# Patient Record
Sex: FEMALE | Race: WHITE | Employment: OTHER | URBAN - METROPOLITAN AREA
[De-identification: names, ages, dates, MRNs, and addresses within clinical notes are randomized per-mention and may not be internally consistent; named-entity substitution may affect disease eponyms.]

---

## 2017-01-30 ENCOUNTER — OFFICE VISIT (OUTPATIENT)
Dept: INTERNAL MEDICINE CLINIC | Facility: CLINIC | Age: 62
End: 2017-01-30

## 2017-01-30 VITALS
DIASTOLIC BLOOD PRESSURE: 70 MMHG | HEART RATE: 76 BPM | BODY MASS INDEX: 22.82 KG/M2 | SYSTOLIC BLOOD PRESSURE: 110 MMHG | HEIGHT: 65 IN | WEIGHT: 137 LBS | TEMPERATURE: 98 F

## 2017-01-30 DIAGNOSIS — R82.90 BAD ODOR OF URINE: Primary | ICD-10-CM

## 2017-01-30 LAB
APPEARANCE: CLEAR
MULTISTIX LOT#: NORMAL NUMERIC
PH, URINE: 6.5 (ref 4.5–8)
SPECIFIC GRAVITY: 1 (ref 1–1.03)
UROBILINOGEN,SEMI-QN: 0.2 MG/DL (ref 0–1.9)

## 2017-01-30 PROCEDURE — 99213 OFFICE O/P EST LOW 20 MIN: CPT | Performed by: NURSE PRACTITIONER

## 2017-01-30 PROCEDURE — 81003 URINALYSIS AUTO W/O SCOPE: CPT | Performed by: NURSE PRACTITIONER

## 2017-01-30 NOTE — PROGRESS NOTES
William Miguel is a 65year old female. Patient presents with:  Urinary Symptoms (urologic): dark yellow urine and foul odor on and off for about 1 month.  No pain, no urgency or frequency      HPI:   Here for eval of dark urine with foul odor for abou Maternal Grandmother    • Heart Disease Mother      hardening of the arteries   • MS [Other] [OTHER] Sister          • lupus [Other] [OTHER] Maternal Aunt          • Cancer Mother      breast/bone        Allergies  No Known Allergies    REVIEW OF SYSTEMS:

## 2017-04-07 ENCOUNTER — CHARTING TRANS (OUTPATIENT)
Dept: OTHER | Age: 62
End: 2017-04-07

## 2017-05-30 ENCOUNTER — CHARTING TRANS (OUTPATIENT)
Dept: OTHER | Age: 62
End: 2017-05-30

## 2017-06-27 ENCOUNTER — CHARTING TRANS (OUTPATIENT)
Dept: OTHER | Age: 62
End: 2017-06-27

## 2017-07-09 ENCOUNTER — HOSPITAL ENCOUNTER (EMERGENCY)
Age: 62
Discharge: HOME | End: 2017-07-09
Attending: EMERGENCY MEDICINE
Payer: COMMERCIAL

## 2017-07-09 VITALS
BODY MASS INDEX: 26 KG/M2 | WEIGHT: 150 LBS | DIASTOLIC BLOOD PRESSURE: 84 MMHG | SYSTOLIC BLOOD PRESSURE: 111 MMHG | HEART RATE: 84 BPM | RESPIRATION RATE: 18 BRPM | OXYGEN SATURATION: 98 % | TEMPERATURE: 99 F

## 2017-07-09 DIAGNOSIS — S60.454A FOREIGN BODY OF RIGHT RING FINGER: Primary | ICD-10-CM

## 2017-07-09 PROCEDURE — 99282 EMERGENCY DEPT VISIT SF MDM: CPT

## 2017-07-09 PROCEDURE — 99281 EMR DPT VST MAYX REQ PHY/QHP: CPT

## 2017-07-09 NOTE — ED PROVIDER NOTES
Patient Seen in: THE [facility] Emergency Department In [place]    History   Patient presents with:  Swelling Edema (cardiovascular, metabolic)    Stated Complaint: right hand swelling     HPI    The patient is a 67-year-old female presenting to the emergency and vital signs reviewed. All other systems reviewed and negative except as noted above. PSFH elements reviewed from today and agreed except as otherwise stated in HPI.     Physical Exam   ED Triage Vitals [07/09/17 1728]  BP: 111/84  Pulse: 84  Res

## 2017-07-25 ENCOUNTER — CHARTING TRANS (OUTPATIENT)
Dept: OTHER | Age: 62
End: 2017-07-25

## 2017-08-24 ENCOUNTER — CHARTING TRANS (OUTPATIENT)
Dept: OTHER | Age: 62
End: 2017-08-24

## 2017-10-05 ENCOUNTER — MED REC SCAN ONLY (OUTPATIENT)
Dept: INTERNAL MEDICINE CLINIC | Facility: CLINIC | Age: 62
End: 2017-10-05

## 2017-10-12 ENCOUNTER — CHARTING TRANS (OUTPATIENT)
Dept: OTHER | Age: 62
End: 2017-10-12

## 2017-10-12 ENCOUNTER — LAB SERVICES (OUTPATIENT)
Dept: OTHER | Age: 62
End: 2017-10-12

## 2017-10-16 LAB — PATH REPORT: NORMAL

## 2017-11-20 ENCOUNTER — CHARTING TRANS (OUTPATIENT)
Dept: OTHER | Age: 62
End: 2017-11-20

## 2017-12-02 ENCOUNTER — TELEPHONE (OUTPATIENT)
Dept: INTERNAL MEDICINE CLINIC | Facility: CLINIC | Age: 62
End: 2017-12-02

## 2017-12-02 ENCOUNTER — HOSPITAL ENCOUNTER (OUTPATIENT)
Facility: HOSPITAL | Age: 62
Setting detail: OBSERVATION
Discharge: HOME | End: 2017-12-03
Attending: EMERGENCY MEDICINE | Admitting: HOSPITALIST
Payer: COMMERCIAL

## 2017-12-02 ENCOUNTER — APPOINTMENT (OUTPATIENT)
Dept: GENERAL RADIOLOGY | Age: 62
End: 2017-12-02
Attending: EMERGENCY MEDICINE
Payer: COMMERCIAL

## 2017-12-02 ENCOUNTER — APPOINTMENT (OUTPATIENT)
Dept: CT IMAGING | Age: 62
End: 2017-12-02
Attending: EMERGENCY MEDICINE
Payer: COMMERCIAL

## 2017-12-02 ENCOUNTER — APPOINTMENT (OUTPATIENT)
Dept: CV DIAGNOSTICS | Facility: HOSPITAL | Age: 62
End: 2017-12-02
Attending: HOSPITALIST
Payer: COMMERCIAL

## 2017-12-02 DIAGNOSIS — R55 SYNCOPE AND COLLAPSE: Primary | ICD-10-CM

## 2017-12-02 DIAGNOSIS — S01.01XA SCALP LACERATION, INITIAL ENCOUNTER: ICD-10-CM

## 2017-12-02 DIAGNOSIS — C88.0 WALDENSTROM MACROGLOBULINEMIA (HCC): ICD-10-CM

## 2017-12-02 PROCEDURE — 99220 INITIAL OBSERVATION CARE,LEVL III: CPT | Performed by: HOSPITALIST

## 2017-12-02 PROCEDURE — 93306 TTE W/DOPPLER COMPLETE: CPT | Performed by: HOSPITALIST

## 2017-12-02 PROCEDURE — 70450 CT HEAD/BRAIN W/O DYE: CPT | Performed by: EMERGENCY MEDICINE

## 2017-12-02 PROCEDURE — 71010 XR CHEST AP PORTABLE  (CPT=71010): CPT | Performed by: EMERGENCY MEDICINE

## 2017-12-02 RX ORDER — LEVOTHYROXINE SODIUM 0.05 MG/1
50 TABLET ORAL
Status: DISCONTINUED | OUTPATIENT
Start: 2017-12-02 | End: 2017-12-03

## 2017-12-02 RX ORDER — TRAZODONE HYDROCHLORIDE 50 MG/1
50 TABLET ORAL NIGHTLY PRN
Status: DISCONTINUED | OUTPATIENT
Start: 2017-12-02 | End: 2017-12-03

## 2017-12-02 RX ORDER — ACETAMINOPHEN 325 MG/1
650 TABLET ORAL EVERY 6 HOURS PRN
Status: DISCONTINUED | OUTPATIENT
Start: 2017-12-02 | End: 2017-12-03

## 2017-12-02 RX ORDER — ASPIRIN 325 MG
325 TABLET, DELAYED RELEASE (ENTERIC COATED) ORAL ONCE
Status: COMPLETED | OUTPATIENT
Start: 2017-12-02 | End: 2017-12-02

## 2017-12-02 RX ORDER — SODIUM CHLORIDE 9 MG/ML
INJECTION, SOLUTION INTRAVENOUS CONTINUOUS
Status: ACTIVE | OUTPATIENT
Start: 2017-12-02 | End: 2017-12-02

## 2017-12-02 RX ORDER — ONDANSETRON 2 MG/ML
8 INJECTION INTRAMUSCULAR; INTRAVENOUS EVERY 6 HOURS PRN
Status: DISCONTINUED | OUTPATIENT
Start: 2017-12-02 | End: 2017-12-03

## 2017-12-02 NOTE — TELEPHONE ENCOUNTER
Pt called stated Thurs morning she fell she must have passed out because her  found her laying on the ground. She hit the back of her head and was bleeding (not bleeding now). Pt stated she woke up today with a headache and blurry vision. Per JV provider on call stated to go to the ER. Pt verbalized understanding.

## 2017-12-02 NOTE — ED INITIAL ASSESSMENT (HPI)
Here for headache, blurred vision /  States she had a Syncopal episode Thursday morning, passed out on her bathroom floor. +head injury.

## 2017-12-02 NOTE — ED PROVIDER NOTES
Patient Seen in: [facility] Emergency Department In [place]    History   Patient presents with:  Syncope (cardiovascular, neurologic)    Stated Complaint: syncopal and visual disturbance     HPI    67-year-old female presents emergency room for evaluation ([facility] 02/03/16    waldenstonstroms macro globulen anemia   • Thyroid disease        Past Surgical History:  2005: COLPOSCOPY, CERVIX W/UPPER ADJACENT VAGINA; W/*  01/2010 : OTHER SURGICAL HISTORY      Comment: Nasal surgery  No date: OTHER SURGICAL HISTORY tenderness  EXTREMITIES: No tenderness or deformity bilateral upper or lower extremities. No peripheral edema, no calf tenderness, dorsal pedal pulses present and equal bilaterally. SKIN: Warm, dry, intact, no rashes.   NEUROLOGIC EXAM: Tongue midline, no T-wave abnormality           ED Course as of Dec 02 1314  ------------------------------------------------------------       [facility]   Patient IV established, placed on cardiac monitor and pulse ox. Patient has been stable throughout ER stay.   Discussed all f

## 2017-12-02 NOTE — H&P
[facility]ITAL[facility]  History and Physical     Natalee Sy Patient Status:  Observation    1955 MRN [id number]   [facility] 8NE-A Attending Marlon Garcia MD   Hosp Day # 0 PCP Aníbal Garcia MD     Chief Complaint: passed out Prescriptions on File Prior to Encounter:  Levothyroxine Sodium (SYNTHROID) 50 MCG Oral Tab TAKE 1 TABLET BY MOUTH EVERY DAY Disp: 90 tablet Rfl: 3   TURMERIC OR Take 1 tablet by mouth daily. Disp:  Rfl:    Omega-3 Fatty Acids (FISH OIL OR) Take by mouth. (based on SCr of 0.63 mg/dL). Recent Labs   Lab  12/02/17   1017   PTP  12.4   INR  0.95       Recent Labs   Lab  12/02/17   1017   TROP  <0.046       Imaging: Imaging data reviewed in Epic. ASSESSMENT / PLAN:     1.  Syncope-sounds like micturition

## 2017-12-02 NOTE — PLAN OF CARE
Received patient from  ED around 1430. Patient admitted for syncopal episode that occurred Thursday at home, where she hit her head and now has persistent headache with some blurred vision. Patient a&ox4. Vital signs stable- Afebrile. NSR on tele.  [address]

## 2017-12-02 NOTE — PROGRESS NOTES
12/02/17 1448 12/02/17 1450 12/02/17 1452   Vital Signs   Pulse 69 68 74   Heart Rate Source Monitor --  --    Cardiac Rhythm NSR --  --    Resp 16 16 --    Respiratory Quality Normal --  --    /70 111/75 113/74   BP Location Left arm Left arm Lef

## 2017-12-02 NOTE — ED NOTES
Report to Humberto Clifford RN. THE [facility] ambulance was contacted to transport this patient.

## 2017-12-03 ENCOUNTER — APPOINTMENT (OUTPATIENT)
Dept: MRI IMAGING | Facility: HOSPITAL | Age: 62
End: 2017-12-03
Attending: Other
Payer: COMMERCIAL

## 2017-12-03 VITALS
TEMPERATURE: 98 F | WEIGHT: 129.44 LBS | HEIGHT: 64 IN | OXYGEN SATURATION: 100 % | DIASTOLIC BLOOD PRESSURE: 69 MMHG | RESPIRATION RATE: 18 BRPM | HEART RATE: 85 BPM | BODY MASS INDEX: 22.1 KG/M2 | SYSTOLIC BLOOD PRESSURE: 113 MMHG

## 2017-12-03 PROCEDURE — 70551 MRI BRAIN STEM W/O DYE: CPT | Performed by: OTHER

## 2017-12-03 PROCEDURE — 95816 EEG AWAKE AND DROWSY: CPT | Performed by: OTHER

## 2017-12-03 PROCEDURE — 99245 OFF/OP CONSLTJ NEW/EST HI 55: CPT | Performed by: OTHER

## 2017-12-03 PROCEDURE — 99225 SUBSEQUENT OBSERVATION CARE: CPT | Performed by: HOSPITALIST

## 2017-12-03 NOTE — PLAN OF CARE
Tele D/C'd. IV discontinued with catheter intact. Patient denies chest pain, SOB, dizziness or palpitations. Patient denies calf tenderness. Patient discharge instructions and medication side effects reviewed with patient and verbalized understanding.   Fany Cagle

## 2017-12-03 NOTE — PROGRESS NOTES
[facility]  Progress note     Celsoaram Fam Patient Status:  Observation    1955 MRN [id number]   [facility] 8NE-A Attending Rosario Tavares MD   Hosp Day # 0 PCP Reuben Phelps MD     Chief Complaint: passed out    [facility] pending    Quality:  · DVT Prophylaxis: scds  · CODE status: full  · Sanchez: no    Plan of care discussed with patient and rn    Flex Camara MD

## 2017-12-03 NOTE — DISCHARGE SUMMARY
[facility] HOSPITALIST  DISCHARGE SUMMARY     Ed Bookbinder Patient Status:  Observation    1955 MRN [id number]   [facility] 8NE-A Attending Julio César Posey MD   Hosp Day # 0 PCP Marie Calabrese MD     Date of Admission: 2017  Balwinder hospitalization:   • See above    Incidental or significant findings and recommendations (brief descriptions):  • none    Lab/Test results pending at Discharge:   · none    Consultants:  • Neuro,cards    Discharge Medication List:     Discharge Medications

## 2017-12-03 NOTE — PLAN OF CARE
CARDIOVASCULAR - ADULT    • Maintains optimal cardiac output and hemodynamic stability Progressing    • Absence of cardiac arrhythmias or at baseline Progressing        SAFETY ADULT - FALL    • Free from fall injury Progressing          Assumed care for pt

## 2017-12-03 NOTE — PROGRESS NOTES
66 y/o female with hx NHL with Waldenstroms macroglobulinemia had syncopal event Thursday. Blurry vision yesterday. Most likely micturition syncope. Needs echo EEG but seizure unlikely. CTA corns in morning and if normal then home.

## 2017-12-03 NOTE — PROGRESS NOTES
S: no symptoms; feeling well  O: no events on tele; exam benign; vitals stable  A/p: suspect micturition syncope and resulting concussion; however, had no prodrome to event; consider arrhymogenic cause; possible need for event monitor? ?  Await cards recs;

## 2017-12-03 NOTE — CONSULTS
[facility]    Report of Consultation    Torie Juan Patient Status:  Observation    1955 MRN [id number]   [facility] 8NE-A Attending Lexie Wooten MD   Hosp Day # 0 PCP Dania Healy MD     Date of Admission:   reports that she has never smoked. She has never used smokeless tobacco. She reports that she does not drink alcohol or use drugs.     Allergies:  No Known Allergies    Medications:    Current Facility-Administered Medications:   •  Levothyroxine Sodium (SY ([address]     Syncope and collapse     Scalp laceration, initial encounter     Concussion with loss of consciousness        Impression:  Episode of loss of consciousness  Post concussive syndrome  Post concussive headache    Discussion/recommendations:  Sudden

## 2017-12-04 NOTE — CONSULTS
[facility]    PATIENT'S NAME: Romulo Nicolas   ATTENDING PHYSICIAN: Frank Lyman M.D.   [facility]: Hiro Freitas M.D.    PATIENT ACCOUNT#:   [de-identified]    LOCATION:  [address] [place] [address]  MEDICAL RECORD #:   [id number]       DATE OF BIRTH you.    Dictated By Jacinto Chong M.D.  d: 12/03/2017 13:42:56  t: 12/03/2017 14:26:18  [facility] [id number]  [facility]/

## 2017-12-04 NOTE — PROCEDURES
[facility], [address] [place] [address]      PATIENT'S NAME:  Nnamdi   ATTENDING PHYSICIAN: Day Quiroz M.D.    PATIENT ACCOUNT #: [de-identified] LOCATION: [address] [place] [address]   MEDICAL RECORD #: [id number] DATE OF BIR

## 2017-12-04 NOTE — PAYOR COMM NOTE
--------------  ADMISSION REVIEW     Payor: Amber Perfecto #:  [id number]  Authorization Number: [id number]    Admit date: N/A  Admit time: N/A       Admitting Physician: Gordo He MD  Attending Physician:  No att. providers found  Primary Care P seconds. The therapeutic range has been validated against 0.3-0.7 heparin anti-Xa units/mL. CBC WITH DIFFERENTIAL WITH PLATELET    Narrative: The following orders were created for panel order CBC WITH DIFFERENTIAL WITH PLATELET.   Procedure getting out of bed. The patient is to have an echocardiogram.  I am a little bothered by the blurry vision the following day, which seems a little bit unusual.  Patient is to get an EEG, but this does not seem to be a seizure disorder.   I probably will g

## 2017-12-05 ENCOUNTER — PATIENT OUTREACH (OUTPATIENT)
Dept: CASE MANAGEMENT | Age: 62
End: 2017-12-05

## 2017-12-05 ENCOUNTER — PATIENT MESSAGE (OUTPATIENT)
Dept: CASE MANAGEMENT | Age: 62
End: 2017-12-05

## 2017-12-05 DIAGNOSIS — Z02.9 ENCOUNTERS FOR ADMINISTRATIVE PURPOSE: ICD-10-CM

## 2017-12-05 NOTE — PROGRESS NOTES
LM for pt to call NCM back for condition update. [employer] message sent as well for condition update.

## 2017-12-08 ENCOUNTER — HOSPITAL ENCOUNTER (OUTPATIENT)
Dept: CT IMAGING | Facility: HOSPITAL | Age: 62
Discharge: HOME | End: 2017-12-08
Attending: INTERNAL MEDICINE
Payer: COMMERCIAL

## 2017-12-08 DIAGNOSIS — R55 SYNCOPE AND COLLAPSE: ICD-10-CM

## 2017-12-08 DIAGNOSIS — C88.0 WALDENSTROM MACROGLOBULINEMIA (HCC): ICD-10-CM

## 2017-12-08 PROCEDURE — 75574 CT ANGIO HRT W/3D IMAGE: CPT | Performed by: INTERNAL MEDICINE

## 2017-12-08 RX ORDER — NITROGLYCERIN 0.4 MG/1
TABLET SUBLINGUAL
Status: COMPLETED
Start: 2017-12-08 | End: 2017-12-08

## 2017-12-08 RX ADMIN — NITROGLYCERIN 0.4 MG: 0.4 TABLET SUBLINGUAL at 10:45:00

## 2017-12-15 ENCOUNTER — OFFICE VISIT (OUTPATIENT)
Dept: INTERNAL MEDICINE CLINIC | Facility: CLINIC | Age: 62
End: 2017-12-15

## 2017-12-15 VITALS
WEIGHT: 130 LBS | BODY MASS INDEX: 22.2 KG/M2 | TEMPERATURE: 99 F | DIASTOLIC BLOOD PRESSURE: 74 MMHG | SYSTOLIC BLOOD PRESSURE: 130 MMHG | HEART RATE: 80 BPM | HEIGHT: 64 IN

## 2017-12-15 DIAGNOSIS — R05.9 COUGH: ICD-10-CM

## 2017-12-15 DIAGNOSIS — R55 SYNCOPE, UNSPECIFIED SYNCOPE TYPE: ICD-10-CM

## 2017-12-15 DIAGNOSIS — J06.9 ACUTE URI: Primary | ICD-10-CM

## 2017-12-15 PROCEDURE — 99214 OFFICE O/P EST MOD 30 MIN: CPT | Performed by: NURSE PRACTITIONER

## 2017-12-15 RX ORDER — AMOXICILLIN AND CLAVULANATE POTASSIUM 875; 125 MG/1; MG/1
1 TABLET, FILM COATED ORAL 2 TIMES DAILY
Qty: 20 TABLET | Refills: 0 | Status: SHIPPED | OUTPATIENT
Start: 2017-12-15 | End: 2017-12-25

## 2017-12-15 RX ORDER — CODEINE PHOSPHATE AND GUAIFENESIN 10; 100 MG/5ML; MG/5ML
10 SOLUTION ORAL NIGHTLY PRN
Qty: 240 ML | Refills: 0 | Status: SHIPPED | OUTPATIENT
Start: 2017-12-15 | End: 2018-01-25 | Stop reason: ALTCHOICE

## 2017-12-15 RX ORDER — AZELAIC ACID 0.15 G/G
1 GEL TOPICAL DAILY
COMMUNITY

## 2017-12-15 NOTE — PROGRESS NOTES
Leighton Calvin is a 65year old female. Patient presents with:  Test Results: Room 10. Review CTA  Cough: congestion, facial pain, eye swelling and ear pain/pressure for 10 days       HPI:   Here for test results and not feeling well.    She was hospit Oral Tab Take  by mouth. Disp:  Rfl:    Vitamin B-12 (VITAMIN B12) 1000 MCG Oral Tab Take 1,000 mcg by mouth daily.  Disp:  Rfl:       Past Medical History:   Diagnosis Date   • Abnormal Pap smear 2000   • Basal cell cancer    • Disorder of thyroid    • HYP alert and oriented x 3    ASSESSMENT AND PLAN:     Acute uri  (primary encounter diagnosis)  augmentin bid   Add flonase and cheratussin ac hs only. If eye irritation continues, she will call for drops.     Cough  Syncope, unspecified syncope type  W/u re

## 2017-12-18 ENCOUNTER — CHARTING TRANS (OUTPATIENT)
Dept: OTHER | Age: 62
End: 2017-12-18

## 2017-12-18 NOTE — PROGRESS NOTES
Multiple attempts to reach the pt and messages left with no returned phone call. Pt went to [place] on 12/15/17. Closing encounter.

## 2017-12-19 ENCOUNTER — TELEPHONE (OUTPATIENT)
Dept: INTERNAL MEDICINE CLINIC | Facility: CLINIC | Age: 62
End: 2017-12-19

## 2017-12-19 RX ORDER — AZITHROMYCIN 250 MG/1
TABLET, FILM COATED ORAL
Qty: 6 TABLET | Refills: 0 | Status: SHIPPED | OUTPATIENT
Start: 2017-12-19 | End: 2018-01-22

## 2017-12-19 NOTE — TELEPHONE ENCOUNTER
Patient notified to stop Augmentin, BRAT diet, monitor loose stools and call if s/s do not resolve. SD would like pt to start Z pack sent to [facility] on file for sinus infection. Pt verbalizes understanding.

## 2017-12-19 NOTE — TELEPHONE ENCOUNTER
Patient states she saw SD on Friday 12/15/17, started Augmentin(2 doses) then Saturday night pt was up all night with loose stool which has not decreased, at least 10x's a day, tried watching what she was eating, felt a little better yesterday so she ate t

## 2017-12-19 NOTE — TELEPHONE ENCOUNTER
Pt is taking Augmentin and she has an upset stomach/diarrhea since starting the medication. Is it possible its due to the medication?   Please advise

## 2018-01-22 PROBLEM — R19.4 CHANGE IN BOWEL HABITS: Status: ACTIVE | Noted: 2018-01-22

## 2018-01-22 PROBLEM — R15.9 INCONTINENCE OF FECES: Status: ACTIVE | Noted: 2018-01-22

## 2018-01-25 ENCOUNTER — OFFICE VISIT (OUTPATIENT)
Dept: NEUROLOGY | Facility: CLINIC | Age: 63
End: 2018-01-25

## 2018-01-25 VITALS
SYSTOLIC BLOOD PRESSURE: 102 MMHG | WEIGHT: 130 LBS | HEART RATE: 56 BPM | RESPIRATION RATE: 16 BRPM | DIASTOLIC BLOOD PRESSURE: 66 MMHG | BODY MASS INDEX: 22 KG/M2

## 2018-01-25 DIAGNOSIS — M54.2 NECK PAIN: ICD-10-CM

## 2018-01-25 DIAGNOSIS — G43.109 OCULAR MIGRAINE: ICD-10-CM

## 2018-01-25 DIAGNOSIS — R55 SYNCOPE, UNSPECIFIED SYNCOPE TYPE: Primary | ICD-10-CM

## 2018-01-25 PROCEDURE — 99215 OFFICE O/P EST HI 40 MIN: CPT | Performed by: OTHER

## 2018-01-25 NOTE — PROGRESS NOTES
[facility] in [place]  Neurology - Clinic Follow up  2018, 12:18 PM     Torie Juan Patient Status:  No patient class for patient encounter    1955 MRN [id number]   Location [unfilled] PCP Dania Healy MD     Pa pain/belching    • Frequent use of laxatives Imodium   • Headache    • Hemorrhoids    • Hoarseness, chronic    • HYPOTHYROIDISM    • Irregular bowel habits    • Leaking of urine    • Night sweats    • Non Hodgkin's lymphoma ([facility] 02/03/16    ml See history; relevant items discussed in the history.   Psychiatric: no depression, no suicidal attempt,   Musculoskeletal:  NO current complaint,  Endo: no cold intolerance, appetite is normal, no weight change;  Skin: warm, no rash, no allergy , no skin b unclear- micturition syncope vs. Migraine equivalent? EEG and MRI were negative (MRI only with mild incidental not consequential findings, nonspecific white matter T2 flair hyperintensities, and venous anomaly, discussed with her in detail).  She has had ca

## 2018-01-25 NOTE — PROGRESS NOTES
Pt here for hospital follow up for syncopal episode. Pt reports no similar episodes since then, and that she is feeling well.

## 2018-01-25 NOTE — PATIENT INSTRUCTIONS
Refill policies:    • Allow 2-3 business days for refills; controlled substances may take longer.   • Contact your pharmacy at least 5 days prior to running out of medication and have them send an electronic request or submit request through the [facility] recommended that you have a procedure or additional testing performed. [facility]) will contact your insurance carrier to obtain pre-certification or prior authorization.     Unfortunately, [facility] has seen an increase in denial of paym

## 2018-02-01 ENCOUNTER — HOSPITAL ENCOUNTER (OUTPATIENT)
Dept: CT IMAGING | Age: 63
Discharge: HOME | End: 2018-02-01
Attending: Other
Payer: COMMERCIAL

## 2018-02-01 DIAGNOSIS — M54.2 NECK PAIN: ICD-10-CM

## 2018-02-01 DIAGNOSIS — R55 SYNCOPE, UNSPECIFIED SYNCOPE TYPE: ICD-10-CM

## 2018-02-01 LAB — CREAT SERPL-MCNC: 0.6 MG/DL (ref 0.55–1.02)

## 2018-02-01 PROCEDURE — 70496 CT ANGIOGRAPHY HEAD: CPT | Performed by: OTHER

## 2018-02-01 PROCEDURE — 82565 ASSAY OF CREATININE: CPT

## 2018-02-01 PROCEDURE — 70498 CT ANGIOGRAPHY NECK: CPT | Performed by: OTHER

## 2018-02-05 ENCOUNTER — CHARTING TRANS (OUTPATIENT)
Dept: OTHER | Age: 63
End: 2018-02-05

## 2018-03-03 ENCOUNTER — OFFICE VISIT (OUTPATIENT)
Dept: INTERNAL MEDICINE CLINIC | Facility: CLINIC | Age: 63
End: 2018-03-03

## 2018-03-03 VITALS
HEART RATE: 72 BPM | TEMPERATURE: 98 F | WEIGHT: 132 LBS | HEIGHT: 65 IN | DIASTOLIC BLOOD PRESSURE: 60 MMHG | BODY MASS INDEX: 21.99 KG/M2 | SYSTOLIC BLOOD PRESSURE: 98 MMHG

## 2018-03-03 DIAGNOSIS — N89.8 VAGINAL DISCHARGE: Primary | ICD-10-CM

## 2018-03-03 DIAGNOSIS — N89.8 VAGINAL IRRITATION: ICD-10-CM

## 2018-03-03 LAB
APPEARANCE: CLEAR
MULTISTIX LOT#: ABNORMAL NUMERIC
PH, URINE: 7.5 (ref 4.5–8)
SPECIFIC GRAVITY: 1.02 (ref 1–1.03)
URINE-COLOR: YELLOW
UROBILINOGEN,SEMI-QN: 0.2 MG/DL (ref 0–1.9)

## 2018-03-03 PROCEDURE — 81003 URINALYSIS AUTO W/O SCOPE: CPT | Performed by: PHYSICIAN ASSISTANT

## 2018-03-03 PROCEDURE — 87086 URINE CULTURE/COLONY COUNT: CPT | Performed by: PHYSICIAN ASSISTANT

## 2018-03-03 PROCEDURE — 87480 CANDIDA DNA DIR PROBE: CPT | Performed by: PHYSICIAN ASSISTANT

## 2018-03-03 PROCEDURE — 87660 TRICHOMONAS VAGIN DIR PROBE: CPT | Performed by: PHYSICIAN ASSISTANT

## 2018-03-03 PROCEDURE — 87510 GARDNER VAG DNA DIR PROBE: CPT | Performed by: PHYSICIAN ASSISTANT

## 2018-03-03 PROCEDURE — 99213 OFFICE O/P EST LOW 20 MIN: CPT | Performed by: PHYSICIAN ASSISTANT

## 2018-03-03 RX ORDER — METRONIDAZOLE 500 MG/1
500 TABLET ORAL 2 TIMES DAILY
Qty: 14 TABLET | Refills: 0 | Status: SHIPPED | OUTPATIENT
Start: 2018-03-03 | End: 2018-03-10

## 2018-03-03 NOTE — PROGRESS NOTES
Patient presents with:  Vaginal Discharge: LG. Room 2. Greenish/brown discharge that is liquidy and isnt sure if its urine or discharge for 2 days       HPI:  Pt presents with c/o 2 days of a thin brownish-green d/c she has noticed on her panty liner.   Alexandra Serrano metRONIDAZOLE 500 MG Oral Tab Take 1 tablet (500 mg total) by mouth 2 (two) times daily. Disp: 14 tablet Rfl: 0   Azelaic Acid (FINACEA) 15 % External Gel Apply 1 Application topically daily. Disp:  Rfl:    Omega-3 Fatty Acids (FISH OIL OR) Take by mouth. Vaginitis/Vaginosis, Dna Probe    Meds & Refills for this Visit:  Signed Prescriptions Disp Refills    metRONIDAZOLE 500 MG Oral Tab 14 tablet 0      Sig: Take 1 tablet (500 mg total) by mouth 2 (two) times daily.            Imaging & Consults:  None · Don't douche. · Don't have sex. If you do have sex, then take steps to lower your risk:  ¨ Use condoms when having sex. ¨ Limit the number of sexual partners you have. Follow-up care  Follow up with your healthcare provider, or as advised.   When to se

## 2018-03-03 NOTE — PATIENT INSTRUCTIONS
Bacterial Vaginosis    You have a vaginal infection called bacterial vaginosis (BV). Both good and bad bacteria are present in a healthy vagina. BV occurs when these bacteria get out of balance. The number of bad bacteria increase.  And the number of good · You have a fever of 100.4ºF (38ºC) or higher, or as directed by your provider. · Your symptoms worsen, or they don’t go away within a few days of starting treatment. · You have new pain in the lower belly or pelvic region.   · You have side effects that

## 2018-03-05 ENCOUNTER — CHARTING TRANS (OUTPATIENT)
Dept: OTHER | Age: 63
End: 2018-03-05

## 2018-03-06 NOTE — PROGRESS NOTES
If the discharge has stopped on flagyl and she is tolerating well then (based on exam findings) she could finish the Flagyl course. If still with same thin d/c I would have her stop the Flagyl and f/u in office.

## 2018-04-02 ENCOUNTER — CHARTING TRANS (OUTPATIENT)
Dept: OTHER | Age: 63
End: 2018-04-02

## 2018-05-07 ENCOUNTER — CHARTING TRANS (OUTPATIENT)
Dept: OTHER | Age: 63
End: 2018-05-07

## 2018-05-31 ENCOUNTER — CHARTING TRANS (OUTPATIENT)
Dept: OTHER | Age: 63
End: 2018-05-31

## 2018-06-04 ENCOUNTER — CHARTING TRANS (OUTPATIENT)
Dept: OTHER | Age: 63
End: 2018-06-04

## 2018-06-28 ENCOUNTER — OFFICE VISIT (OUTPATIENT)
Dept: NEUROLOGY | Facility: CLINIC | Age: 63
End: 2018-06-28

## 2018-06-28 VITALS
BODY MASS INDEX: 21.99 KG/M2 | DIASTOLIC BLOOD PRESSURE: 64 MMHG | WEIGHT: 132 LBS | HEIGHT: 65 IN | SYSTOLIC BLOOD PRESSURE: 90 MMHG | RESPIRATION RATE: 16 BRPM | HEART RATE: 66 BPM

## 2018-06-28 DIAGNOSIS — R55 SYNCOPE, UNSPECIFIED SYNCOPE TYPE: Primary | ICD-10-CM

## 2018-06-28 DIAGNOSIS — R41.3 SHORT-TERM MEMORY LOSS: ICD-10-CM

## 2018-06-28 DIAGNOSIS — G43.109 OCULAR MIGRAINE: ICD-10-CM

## 2018-06-28 DIAGNOSIS — D75.9 HYPERVISCOSITY: ICD-10-CM

## 2018-06-28 PROCEDURE — 99214 OFFICE O/P EST MOD 30 MIN: CPT | Performed by: OTHER

## 2018-06-28 NOTE — PROGRESS NOTES
[facility] in [place]  Neurology - Clinic Follow up       Sania Arriaga Patient Status:  No patient class for patient encounter    1955 MRN [id number]   Location [unfilled] PCP Reuben Phelps MD     No chief complaint on fi 11/7/2014   Vitamin B12      200 - 1,100 pg/mL  1,479 (H)   TSH      0.40 - 4.50 mIU/L 2.23        PAST MEDICAL HISTORY:   Past Medical History:   Diagnosis Date   • Abnormal Pap smear 2000   • Anemia    • Basal cell cancer    • Blurred vision    • Body pi SYSTEMS:  General: denies any fever or chills. Eye: no pain or redness;  Ear, mouth, throat: no hearing change, no throat pain or soreness; Respiratory: Denies: Difficulty Breathing, Chronic Cough and Wheezing.   Cardiovascular: NO Chest Pain and Palpi GFRNAA 92 03/02/2015   CA 8.9 12/02/2017    12/02/2017   K 4.1 12/02/2017    12/02/2017   CO2 28.0 12/02/2017         Imaging:  As above    Assessment/Plan:   1. Syncope, unspecified syncope type    2. Ocular migraine    3.  Hyperviscosity

## 2018-06-28 NOTE — PATIENT INSTRUCTIONS
Refill policies:    • Allow 2-3 business days for refills; controlled substances may take longer.   • Contact your pharmacy at least 5 days prior to running out of medication and have them send an electronic request or submit request through the “request re entire amount billed. Precertification and Prior Authorizations: If your physician has recommended that you have a procedure or additional testing performed.   [facility]) will contact your insurance carrier to obtain pre-certi

## 2018-07-02 ENCOUNTER — CHARTING TRANS (OUTPATIENT)
Dept: OTHER | Age: 63
End: 2018-07-02

## 2018-07-30 ENCOUNTER — CHARTING TRANS (OUTPATIENT)
Dept: OTHER | Age: 63
End: 2018-07-30

## 2018-11-29 ENCOUNTER — CHARTING TRANS (OUTPATIENT)
Dept: OTHER | Age: 63
End: 2018-11-29

## 2018-12-12 ENCOUNTER — OFFICE VISIT (OUTPATIENT)
Dept: DERMATOLOGY | Age: 63
End: 2018-12-12

## 2018-12-12 DIAGNOSIS — Z41.9 SURGERY, ELECTIVE: Primary | ICD-10-CM

## 2018-12-12 PROCEDURE — X1094 NO CHARGE VISIT: HCPCS

## 2018-12-17 ENCOUNTER — OFFICE VISIT (OUTPATIENT)
Dept: DERMATOLOGY | Age: 63
End: 2018-12-17

## 2018-12-17 DIAGNOSIS — D48.5 NEOPLASM OF UNCERTAIN BEHAVIOR OF SKIN: Primary | ICD-10-CM

## 2018-12-17 DIAGNOSIS — L82.0 SEBORRHEIC KERATOSES, INFLAMED: ICD-10-CM

## 2018-12-17 PROBLEM — L82.1 SEBORRHEIC KERATOSIS: Status: ACTIVE | Noted: 2018-12-17

## 2018-12-17 PROCEDURE — 11100 BIOPSY OF SKIN LESION: CPT | Performed by: NURSE PRACTITIONER

## 2018-12-17 PROCEDURE — 17110 DESTRUCTION B9 LES UP TO 14: CPT | Performed by: NURSE PRACTITIONER

## 2018-12-17 PROCEDURE — 99213 OFFICE O/P EST LOW 20 MIN: CPT | Performed by: NURSE PRACTITIONER

## 2018-12-17 RX ORDER — AZELAIC ACID 0.15 G/G
GEL TOPICAL
COMMUNITY
Start: 2018-01-30 | End: 2019-10-24 | Stop reason: SDUPTHER

## 2018-12-17 RX ORDER — BRIMONIDINE 5 MG/G
GEL TOPICAL
COMMUNITY
Start: 2018-11-29

## 2018-12-17 RX ORDER — OMEPRAZOLE 10 MG/1
10 CAPSULE, DELAYED RELEASE ORAL
COMMUNITY

## 2018-12-17 RX ORDER — CYCLOSPORINE 0.5 MG/ML
1 EMULSION OPHTHALMIC
COMMUNITY

## 2018-12-17 RX ORDER — LEVOTHYROXINE SODIUM 0.05 MG/1
TABLET ORAL
COMMUNITY
Start: 2015-06-16

## 2018-12-19 LAB — PATH REPORT PLASRBC-IMP: NORMAL

## 2018-12-27 ENCOUNTER — OFFICE VISIT (OUTPATIENT)
Dept: DERMATOLOGY | Age: 63
End: 2018-12-27

## 2018-12-27 DIAGNOSIS — Z41.9 SURGERY, ELECTIVE: Primary | ICD-10-CM

## 2018-12-27 PROCEDURE — X1098 COSMETIC CHEMICAL PEEL LEVEL I: HCPCS

## 2019-01-04 ENCOUNTER — MED REC SCAN ONLY (OUTPATIENT)
Dept: INTERNAL MEDICINE CLINIC | Facility: CLINIC | Age: 64
End: 2019-01-04

## 2019-01-08 ENCOUNTER — OFFICE VISIT (OUTPATIENT)
Dept: DERMATOLOGY | Age: 64
End: 2019-01-08

## 2019-01-08 DIAGNOSIS — C44.519 BASAL CELL CARCINOMA OF SKIN OF TRUNK: Primary | ICD-10-CM

## 2019-01-08 PROCEDURE — 17262 DSTRJ MAL LES T/A/L 1.1-2.0: CPT | Performed by: NURSE PRACTITIONER

## 2019-01-23 ENCOUNTER — APPOINTMENT (OUTPATIENT)
Dept: DERMATOLOGY | Age: 64
End: 2019-01-23

## 2019-03-05 ENCOUNTER — OFFICE VISIT (OUTPATIENT)
Dept: DERMATOLOGY | Age: 64
End: 2019-03-05

## 2019-03-05 DIAGNOSIS — Z41.9 SURGERY, ELECTIVE: Primary | ICD-10-CM

## 2019-03-05 DIAGNOSIS — L91.8 CUTANEOUS SKIN TAGS: ICD-10-CM

## 2019-03-05 DIAGNOSIS — L82.0 SEBORRHEIC KERATOSES, INFLAMED: Primary | ICD-10-CM

## 2019-03-05 PROCEDURE — 99213 OFFICE O/P EST LOW 20 MIN: CPT | Performed by: NURSE PRACTITIONER

## 2019-03-05 PROCEDURE — X1099 COSMETIC CHEMICAL PEEL LEVEL II: HCPCS

## 2019-03-05 PROCEDURE — 17110 DESTRUCTION B9 LES UP TO 14: CPT | Performed by: NURSE PRACTITIONER

## 2019-03-05 PROCEDURE — 11200 RMVL SKIN TAGS UP TO&INC 15: CPT | Performed by: NURSE PRACTITIONER

## 2019-03-07 ENCOUNTER — APPOINTMENT (OUTPATIENT)
Dept: DERMATOLOGY | Age: 64
End: 2019-03-07

## 2019-03-25 ENCOUNTER — MED REC SCAN ONLY (OUTPATIENT)
Dept: INTERNAL MEDICINE CLINIC | Facility: CLINIC | Age: 64
End: 2019-03-25

## 2019-04-02 ENCOUNTER — OFFICE VISIT (OUTPATIENT)
Dept: DERMATOLOGY | Age: 64
End: 2019-04-02

## 2019-04-02 DIAGNOSIS — Z41.9 SURGERY, ELECTIVE: Primary | ICD-10-CM

## 2019-04-02 PROCEDURE — X1100: HCPCS

## 2019-04-03 ENCOUNTER — OFFICE VISIT (OUTPATIENT)
Dept: DERMATOLOGY | Age: 64
End: 2019-04-03

## 2019-04-03 DIAGNOSIS — D69.2 SENILE PURPURA (CMD): Primary | ICD-10-CM

## 2019-04-03 PROCEDURE — 99213 OFFICE O/P EST LOW 20 MIN: CPT | Performed by: NURSE PRACTITIONER

## 2019-05-07 ENCOUNTER — APPOINTMENT (OUTPATIENT)
Dept: DERMATOLOGY | Age: 64
End: 2019-05-07

## 2019-05-14 ENCOUNTER — APPOINTMENT (OUTPATIENT)
Dept: DERMATOLOGY | Age: 64
End: 2019-05-14

## 2019-05-16 ENCOUNTER — APPOINTMENT (OUTPATIENT)
Dept: DERMATOLOGY | Age: 64
End: 2019-05-16

## 2019-05-21 ENCOUNTER — OFFICE VISIT (OUTPATIENT)
Dept: DERMATOLOGY | Age: 64
End: 2019-05-21

## 2019-05-21 DIAGNOSIS — Z41.9 SURGERY, ELECTIVE: Primary | ICD-10-CM

## 2019-05-21 PROCEDURE — X1100: HCPCS

## 2019-06-13 ENCOUNTER — OFFICE VISIT (OUTPATIENT)
Dept: DERMATOLOGY | Age: 64
End: 2019-06-13

## 2019-06-13 DIAGNOSIS — L30.9 DERMATITIS: Primary | ICD-10-CM

## 2019-06-13 PROCEDURE — 99214 OFFICE O/P EST MOD 30 MIN: CPT | Performed by: NURSE PRACTITIONER

## 2019-06-13 RX ORDER — PREDNISONE 20 MG/1
TABLET ORAL
Qty: 30 TABLET | Refills: 0 | Status: SHIPPED | OUTPATIENT
Start: 2019-06-13

## 2019-07-16 ENCOUNTER — TELEPHONE (OUTPATIENT)
Dept: INTERNAL MEDICINE CLINIC | Facility: CLINIC | Age: 64
End: 2019-07-16

## 2019-07-16 NOTE — TELEPHONE ENCOUNTER
Spoke with patient informed per SD will need OV, OV scheduled on 7/22/19. Patient also advised to check with CDC guidelines regarding malaria resistance in the area she will be traveling to. Health dept will also help with this.  Patient verbalized Lowell Webb

## 2019-07-16 NOTE — TELEPHONE ENCOUNTER
Needs ov. She will need to check with CDC guidelines regarding malaria resistance in the area she will be traveling to. Health Department will also help with this.

## 2019-07-16 NOTE — TELEPHONE ENCOUNTER
Patient is traveling to [place] and needs to get Malaria pills. Patient would also like to get something for the long plane ride; like an anti-anxiety medication? ?  Please advise

## 2019-07-16 NOTE — TELEPHONE ENCOUNTER
LOV-03/03/2018 with CB for acute visit, last seen by SD on 01/30/2017 and 12/15/2017, please advise. Spoke with patient she will be traveling out of country the following dates: 8/16/2019-8/28/2019.  Patient requesting malaria pills and something for anxi

## 2019-07-22 ENCOUNTER — OFFICE VISIT (OUTPATIENT)
Dept: INTERNAL MEDICINE CLINIC | Facility: CLINIC | Age: 64
End: 2019-07-22
Payer: COMMERCIAL

## 2019-07-22 VITALS
SYSTOLIC BLOOD PRESSURE: 122 MMHG | RESPIRATION RATE: 18 BRPM | BODY MASS INDEX: 21.83 KG/M2 | WEIGHT: 131 LBS | HEIGHT: 65 IN | DIASTOLIC BLOOD PRESSURE: 76 MMHG | OXYGEN SATURATION: 99 % | TEMPERATURE: 99 F | HEART RATE: 73 BPM

## 2019-07-22 DIAGNOSIS — F40.243 FEAR OF FLYING: ICD-10-CM

## 2019-07-22 DIAGNOSIS — R35.0 URINARY FREQUENCY: Primary | ICD-10-CM

## 2019-07-22 DIAGNOSIS — Z71.84 COUNSELING FOR TRAVEL: ICD-10-CM

## 2019-07-22 LAB
APPEARANCE: CLEAR
MULTISTIX LOT#: NORMAL NUMERIC
PH, URINE: 7 (ref 4.5–8)
SPECIFIC GRAVITY: 1.01 (ref 1–1.03)
URINE-COLOR: YELLOW
UROBILINOGEN,SEMI-QN: 0.2 MG/DL (ref 0–1.9)

## 2019-07-22 PROCEDURE — 90471 IMMUNIZATION ADMIN: CPT | Performed by: NURSE PRACTITIONER

## 2019-07-22 PROCEDURE — 87086 URINE CULTURE/COLONY COUNT: CPT | Performed by: NURSE PRACTITIONER

## 2019-07-22 PROCEDURE — 87088 URINE BACTERIA CULTURE: CPT | Performed by: NURSE PRACTITIONER

## 2019-07-22 PROCEDURE — 87186 SC STD MICRODIL/AGAR DIL: CPT | Performed by: NURSE PRACTITIONER

## 2019-07-22 PROCEDURE — 99214 OFFICE O/P EST MOD 30 MIN: CPT | Performed by: NURSE PRACTITIONER

## 2019-07-22 PROCEDURE — 81003 URINALYSIS AUTO W/O SCOPE: CPT | Performed by: NURSE PRACTITIONER

## 2019-07-22 PROCEDURE — 90715 TDAP VACCINE 7 YRS/> IM: CPT | Performed by: NURSE PRACTITIONER

## 2019-07-22 RX ORDER — ALPRAZOLAM 0.25 MG/1
TABLET ORAL
Qty: 10 TABLET | Refills: 0 | Status: SHIPPED | OUTPATIENT
Start: 2019-07-22 | End: 2019-10-03

## 2019-07-22 NOTE — PROGRESS NOTES
Baldo Edward is a 62year old female. Patient presents with:  Medication Request: Something to help travel/ RM 11 AE       HPI:   Presents to discuss travel to [place]. Individual travel.     They are traveling August 16    She will receive tdap Fatty Acids (FISH OIL OR) Take by mouth. Disp:  Rfl:    Levothyroxine Sodium (SYNTHROID) 50 MCG Oral Tab TAKE 1 TABLET BY MOUTH EVERY DAY Disp: 90 tablet Rfl: 3   Calcium Carbonate (CALCIUM 500 OR) Take  by mouth.  Disp:  Rfl:    Cholecalciferol (VITAMIN D) exertion, no palpatations  GI: denies abdominal pain and denies heartburn, no diarrhea or constipation    As aobve   MUSCULOSKELETAL:  No arthralgias or myalgias  NEURO: denies headaches,     EXAM:   /76   Pulse 73   Temp 98.6 °F (37 °C) (Oral)   R

## 2019-07-24 ENCOUNTER — TELEPHONE (OUTPATIENT)
Dept: INTERNAL MEDICINE CLINIC | Facility: CLINIC | Age: 64
End: 2019-07-24

## 2019-07-24 NOTE — TELEPHONE ENCOUNTER
I will prescribe Malerone to start 1-2 days prior to exposure and continue while traveling and 7 days upon return. I know they are leaving August 16. Need duration of travel to determine supply.     We discussed at ov, malaria treatment from our office is

## 2019-07-24 NOTE — TELEPHONE ENCOUNTER
Spoke with patient's , informed per SD will prescribe Malerone to start 1-2 days prior to exposure and continue while traveling and 7 days upon return.  stating dates they will be traveling are 8/16/2019-8/30/2019.  Again, recommended they see

## 2019-07-24 NOTE — TELEPHONE ENCOUNTER
Spoke with patient's  stating they checked with Travel clinic and Travel clinic had no idea about Malaria treatment.   stating one of his friends is traveling with them and is a patient of our office was prescribed the following:   Atovaquone-

## 2019-07-25 RX ORDER — CIPROFLOXACIN 500 MG/1
500 TABLET, FILM COATED ORAL 2 TIMES DAILY
Qty: 14 TABLET | Refills: 0 | Status: SHIPPED | OUTPATIENT
Start: 2019-07-25 | End: 2019-09-04 | Stop reason: ALTCHOICE

## 2019-07-25 RX ORDER — ATOVAQUONE AND PROGUANIL HYDROCHLORIDE 250; 100 MG/1; MG/1
TABLET, FILM COATED ORAL
Qty: 24 TABLET | Refills: 0 | Status: SHIPPED | OUTPATIENT
Start: 2019-07-25 | End: 2019-10-03

## 2019-07-25 NOTE — TELEPHONE ENCOUNTER
Spoke with patient informed per SD sent Rx to pharmacy on file. Patient verbalized understanding and agreeable to POC.

## 2019-08-12 PROCEDURE — 87086 URINE CULTURE/COLONY COUNT: CPT | Performed by: OBSTETRICS & GYNECOLOGY

## 2019-09-03 ENCOUNTER — TELEPHONE (OUTPATIENT)
Dept: CARDIOLOGY | Age: 64
End: 2019-09-03

## 2019-09-04 ENCOUNTER — OFFICE VISIT (OUTPATIENT)
Dept: INTERNAL MEDICINE CLINIC | Facility: CLINIC | Age: 64
End: 2019-09-04
Payer: COMMERCIAL

## 2019-09-04 VITALS
BODY MASS INDEX: 22.88 KG/M2 | HEART RATE: 76 BPM | TEMPERATURE: 98 F | RESPIRATION RATE: 16 BRPM | HEIGHT: 64 IN | SYSTOLIC BLOOD PRESSURE: 98 MMHG | DIASTOLIC BLOOD PRESSURE: 68 MMHG | WEIGHT: 134 LBS

## 2019-09-04 DIAGNOSIS — Z92.21 HISTORY OF CHEMOTHERAPY: ICD-10-CM

## 2019-09-04 DIAGNOSIS — C88.0 WALDENSTROM MACROGLOBULINEMIA (HCC): ICD-10-CM

## 2019-09-04 DIAGNOSIS — R00.2 PALPITATIONS: Primary | ICD-10-CM

## 2019-09-04 DIAGNOSIS — C85.90 NON-HODGKIN'S LYMPHOMA, UNSPECIFIED BODY REGION, UNSPECIFIED NON-HODGKIN LYMPHOMA TYPE (HCC): ICD-10-CM

## 2019-09-04 DIAGNOSIS — R07.89 ATYPICAL CHEST PAIN: ICD-10-CM

## 2019-09-04 PROCEDURE — 93000 ELECTROCARDIOGRAM COMPLETE: CPT | Performed by: PHYSICIAN ASSISTANT

## 2019-09-04 PROCEDURE — 99214 OFFICE O/P EST MOD 30 MIN: CPT | Performed by: PHYSICIAN ASSISTANT

## 2019-09-04 NOTE — PROGRESS NOTES
Patient presents with:  Palpitations: x 1 mon, \"fluttering\" sensation in chest, some L side chest pain/tightness      HPI:  Pt presents with c/o 1 month of an episodic \"fluttering\" in her chest, happens about once every 2 weeks. Lasts for seconds.   No abnormal blood chemistry     Unspecified hypothyroidism     Waldenstrom macroglobulinemia (HCC)     Non Hodgkin's lymphoma (HCC)     Syncope and collapse     Scalp laceration, initial encounter     Concussion with loss of consciousness     Change in bowel warm and dry. No rash noted. No erythema. No pallor. EKG:  NSR. Normal axis. RBBB (present on old EKG as well). No acute changes. A/P:    Palpitations  (primary encounter diagnosis) - Check labs, echo and holter monitor. F/U in 2-3 weeks.   ER wa

## 2019-09-06 LAB
ABSOLUTE BASOPHILS: 39 CELLS/UL (ref 0–200)
ABSOLUTE EOSINOPHILS: 101 CELLS/UL (ref 15–500)
ABSOLUTE LYMPHOCYTES: 979 CELLS/UL (ref 850–3900)
ABSOLUTE MONOCYTES: 515 CELLS/UL (ref 200–950)
ABSOLUTE NEUTROPHILS: 2266 CELLS/UL (ref 1500–7800)
ALBUMIN/GLOBULIN RATIO: 1.8 (CALC) (ref 1–2.5)
ALBUMIN: 4.6 G/DL (ref 3.6–5.1)
ALKALINE PHOSPHATASE: 72 U/L (ref 33–130)
ALT: 16 U/L (ref 6–29)
AST: 20 U/L (ref 10–35)
BASOPHILS: 1 %
BILIRUBIN, TOTAL: 1.2 MG/DL (ref 0.2–1.2)
BUN: 11 MG/DL (ref 7–25)
CALCIUM: 10 MG/DL (ref 8.6–10.4)
CARBON DIOXIDE: 27 MMOL/L (ref 20–32)
CHLORIDE: 101 MMOL/L (ref 98–110)
CREATININE: 0.65 MG/DL (ref 0.5–0.99)
EGFR IF AFRICN AM: 110 ML/MIN/1.73M2
EGFR IF NONAFRICN AM: 94 ML/MIN/1.73M2
EOSINOPHILS: 2.6 %
GLOBULIN: 2.5 G/DL (CALC) (ref 1.9–3.7)
GLUCOSE: 90 MG/DL (ref 65–139)
HEMATOCRIT: 38.2 % (ref 35–45)
HEMOGLOBIN: 13.2 G/DL (ref 11.7–15.5)
LYMPHOCYTES: 25.1 %
MAGNESIUM: 1.8 MG/DL (ref 1.5–2.5)
MCH: 32 PG (ref 27–33)
MCHC: 34.6 G/DL (ref 32–36)
MCV: 92.7 FL (ref 80–100)
MONOCYTES: 13.2 %
MPV: 10.3 FL (ref 7.5–12.5)
NEUTROPHILS: 58.1 %
PLATELET COUNT: 262 THOUSAND/UL (ref 140–400)
POTASSIUM: 4.5 MMOL/L (ref 3.5–5.3)
PROTEIN, TOTAL: 7.1 G/DL (ref 6.1–8.1)
RDW: 12.4 % (ref 11–15)
RED BLOOD CELL COUNT: 4.12 MILLION/UL (ref 3.8–5.1)
SODIUM: 138 MMOL/L (ref 135–146)
TSH W/REFLEX TO FT4: 2.28 MIU/L (ref 0.4–4.5)
WHITE BLOOD CELL COUNT: 3.9 THOUSAND/UL (ref 3.8–10.8)

## 2019-09-16 ENCOUNTER — TELEPHONE (OUTPATIENT)
Dept: INTERNAL MEDICINE CLINIC | Facility: CLINIC | Age: 64
End: 2019-09-16

## 2019-09-16 NOTE — TELEPHONE ENCOUNTER
LM for pt at [phone] (M) vm to call back for Cxray results. Paperwork at [facility].

## 2019-09-16 NOTE — TELEPHONE ENCOUNTER
Patient notified Bertrand Herrera from [facility] on 9/11/19 shows no acute changes. Pt states she will f/u with CB after her 24 hours Holter Monitor is complete on 9/26/19.   Pt will call to schedule at a later date when she has her calendar in front

## 2019-09-18 NOTE — TELEPHONE ENCOUNTER
Pt calling to see if our office received the results of her echo done at [facility], [phone],     [address] on 9/11/19

## 2019-09-20 NOTE — TELEPHONE ENCOUNTER
Call transferred to triage. Reviewed results of Echo with pt and she verbalized understanding. Pt states she still has very rare episodes of palpitations which do not last long but she does notice them.   Otherwise pt is feeling fine and feels she can chris

## 2019-09-25 ENCOUNTER — HOSPITAL ENCOUNTER (OUTPATIENT)
Dept: CV DIAGNOSTICS | Age: 64
Discharge: HOME | End: 2019-09-25
Attending: PHYSICIAN ASSISTANT
Payer: COMMERCIAL

## 2019-09-25 DIAGNOSIS — R00.2 PALPITATIONS: ICD-10-CM

## 2019-09-25 PROCEDURE — 93225 XTRNL ECG REC<48 HRS REC: CPT | Performed by: PHYSICIAN ASSISTANT

## 2019-09-25 PROCEDURE — 93226 XTRNL ECG REC<48 HR SCAN A/R: CPT | Performed by: PHYSICIAN ASSISTANT

## 2019-09-25 PROCEDURE — 93227 XTRNL ECG REC<48 HR R&I: CPT | Performed by: PHYSICIAN ASSISTANT

## 2019-09-30 ENCOUNTER — TELEPHONE (OUTPATIENT)
Dept: CARDIOLOGY | Age: 64
End: 2019-09-30

## 2019-10-03 ENCOUNTER — OFFICE VISIT (OUTPATIENT)
Dept: INTERNAL MEDICINE CLINIC | Facility: CLINIC | Age: 64
End: 2019-10-03
Payer: COMMERCIAL

## 2019-10-03 VITALS
DIASTOLIC BLOOD PRESSURE: 60 MMHG | WEIGHT: 135 LBS | BODY MASS INDEX: 23.05 KG/M2 | TEMPERATURE: 99 F | SYSTOLIC BLOOD PRESSURE: 100 MMHG | HEART RATE: 64 BPM | HEIGHT: 64 IN | RESPIRATION RATE: 16 BRPM

## 2019-10-03 DIAGNOSIS — I51.89 DIASTOLIC DYSFUNCTION: ICD-10-CM

## 2019-10-03 DIAGNOSIS — R10.10 UPPER ABDOMINAL PAIN: ICD-10-CM

## 2019-10-03 DIAGNOSIS — R00.2 PALPITATIONS: Primary | ICD-10-CM

## 2019-10-03 PROCEDURE — 99214 OFFICE O/P EST MOD 30 MIN: CPT | Performed by: PHYSICIAN ASSISTANT

## 2019-10-03 NOTE — PROGRESS NOTES
Patient presents with:  Heart Problem: rm 2 DO: here to discuss holter results, echo   Follow Up To Mammogram: recently did mammogram on 9/9/19      HPI:  Pt presents for follow up of palpitiitons. She had holter monitor recently.   Essentially normal.  Sh Gel Apply 1 Application topically daily. Disp:  Rfl:    Omega-3 Fatty Acids (FISH OIL OR) Take by mouth.  Disp:  Rfl:    Levothyroxine Sodium (SYNTHROID) 50 MCG Oral Tab TAKE 1 TABLET BY MOUTH EVERY DAY Disp: 90 tablet Rfl: 3   Calcium Carbonate (CALCIUM 50 symptoms worsen or fail to improve. There are no Patient Instructions on file for this visit. All questions were answered and the patient understands the plan.

## 2019-10-08 PROCEDURE — 88175 CYTOPATH C/V AUTO FLUID REDO: CPT | Performed by: OBSTETRICS & GYNECOLOGY

## 2019-10-08 PROCEDURE — 87624 HPV HI-RISK TYP POOLED RSLT: CPT | Performed by: OBSTETRICS & GYNECOLOGY

## 2019-10-24 ENCOUNTER — TELEPHONE (OUTPATIENT)
Dept: DERMATOLOGY | Age: 64
End: 2019-10-24

## 2019-10-24 PROCEDURE — 88305 TISSUE EXAM BY PATHOLOGIST: CPT | Performed by: RADIOLOGY

## 2019-10-24 PROCEDURE — 88344 IMHCHEM/IMCYTCHM EA MLT ANTB: CPT | Performed by: RADIOLOGY

## 2019-10-24 RX ORDER — AZELAIC ACID 0.15 G/G
GEL TOPICAL
Qty: 50 G | Refills: 2 | Status: SHIPPED | OUTPATIENT
Start: 2019-10-24 | End: 2020-11-08

## 2019-12-05 ENCOUNTER — LAB REQUISITION (OUTPATIENT)
Dept: LAB | Facility: HOSPITAL | Age: 64
End: 2019-12-05
Payer: COMMERCIAL

## 2019-12-05 DIAGNOSIS — N63.20 UNSPECIFIED LUMP IN THE LEFT BREAST, UNSPECIFIED QUADRANT: ICD-10-CM

## 2019-12-05 PROCEDURE — 88305 TISSUE EXAM BY PATHOLOGIST: CPT | Performed by: SURGERY

## 2020-02-19 ENCOUNTER — TELEPHONE (OUTPATIENT)
Dept: CARDIOLOGY | Age: 65
End: 2020-02-19

## 2020-07-06 ENCOUNTER — OFFICE VISIT (OUTPATIENT)
Dept: INTERNAL MEDICINE CLINIC | Facility: CLINIC | Age: 65
End: 2020-07-06
Payer: COMMERCIAL

## 2020-07-06 VITALS
WEIGHT: 129.19 LBS | SYSTOLIC BLOOD PRESSURE: 128 MMHG | RESPIRATION RATE: 16 BRPM | HEART RATE: 60 BPM | DIASTOLIC BLOOD PRESSURE: 72 MMHG | BODY MASS INDEX: 21.52 KG/M2 | HEIGHT: 65 IN | TEMPERATURE: 98 F

## 2020-07-06 DIAGNOSIS — S61.419D LACERATION OF HAND WITHOUT FOREIGN BODY, UNSPECIFIED LATERALITY, SUBSEQUENT ENCOUNTER: ICD-10-CM

## 2020-07-06 DIAGNOSIS — H57.89 REDNESS OF BOTH EYES: Primary | ICD-10-CM

## 2020-07-06 PROCEDURE — 99024 POSTOP FOLLOW-UP VISIT: CPT | Performed by: NURSE PRACTITIONER

## 2020-07-06 PROCEDURE — 99214 OFFICE O/P EST MOD 30 MIN: CPT | Performed by: NURSE PRACTITIONER

## 2020-07-06 RX ORDER — TOBRAMYCIN 3 MG/ML
SOLUTION/ DROPS OPHTHALMIC
COMMUNITY
Start: 2020-06-20 | End: 2020-12-24

## 2020-07-06 NOTE — PROGRESS NOTES
Chantal Lowe is a 60year old female. Patient presents with:  Eye Problem: cn room 10: tearing red eyes       HPI:   Presents for eval of bilateral eye irritation. June 20 woke with crusting eyes. [facility] Tobramycin.   Has been usin FOR 7 DAYS.         Past Medical History:   Diagnosis Date   • Abnormal Pap smear 2000   • Anemia    • Basal cell cancer    • Body piercing Ears   • Disorder of thyroid    • Hemorrhoids    • Hoarseness, chronic    • HYPOTHYROIDISM    • Irregular bowel habit nourished,in no apparent distress  HEENT: eyes with bilateral injected sclera. No crusting. No discharge no light sensitivity.     GI: normal bowel sounds, no masses, HSM or tenderness  EXTREMITIES: no edema, normal strength and tone   Hand as above   PSY

## 2020-10-08 ENCOUNTER — APPOINTMENT (OUTPATIENT)
Dept: DERMATOLOGY | Age: 65
End: 2020-10-08

## 2020-11-04 ENCOUNTER — OFFICE VISIT (OUTPATIENT)
Dept: DERMATOLOGY | Age: 65
End: 2020-11-04

## 2020-11-04 DIAGNOSIS — L82.0 SEBORRHEIC KERATOSES, INFLAMED: ICD-10-CM

## 2020-11-04 DIAGNOSIS — L57.0 ACTINIC KERATOSES: Primary | ICD-10-CM

## 2020-11-04 PROCEDURE — 17111 DESTRUCTION B9 LESIONS 15/>: CPT | Performed by: NURSE PRACTITIONER

## 2020-11-04 PROCEDURE — 99213 OFFICE O/P EST LOW 20 MIN: CPT | Performed by: NURSE PRACTITIONER

## 2020-11-04 PROCEDURE — 17000 DESTRUCT PREMALG LESION: CPT | Performed by: NURSE PRACTITIONER

## 2020-11-08 RX ORDER — AZELAIC ACID 0.15 G/G
GEL TOPICAL
Qty: 50 G | Refills: 2 | Status: SHIPPED | OUTPATIENT
Start: 2020-11-08 | End: 2021-11-09 | Stop reason: SDUPTHER

## 2021-02-10 PROBLEM — R19.7 INTERMITTENT DIARRHEA: Status: ACTIVE | Noted: 2017-09-24

## 2021-02-10 PROBLEM — R09.89 GLOBUS SENSATION: Status: ACTIVE | Noted: 2018-06-08

## 2021-02-10 PROBLEM — R09.81 NASAL CONGESTION: Status: ACTIVE | Noted: 2017-09-29

## 2021-02-10 PROBLEM — R09.A2 GLOBUS SENSATION: Status: ACTIVE | Noted: 2018-06-08

## 2021-02-10 PROBLEM — J32.9 RECURRENT SINUSITIS: Status: ACTIVE | Noted: 2017-09-24

## 2021-02-10 PROBLEM — J35.8 ASYMMETRIC TONSILS: Status: ACTIVE | Noted: 2018-03-02

## 2021-02-10 PROBLEM — L82.1 SEBORRHEIC KERATOSIS: Status: ACTIVE | Noted: 2018-12-17

## 2021-02-10 PROBLEM — R20.0 RT FACIAL NUMBNESS: Status: ACTIVE | Noted: 2017-09-29

## 2021-02-10 PROBLEM — R19.8 GLOBUS SENSATION: Status: ACTIVE | Noted: 2018-06-08

## 2021-02-10 PROBLEM — K21.9 ESOPHAGEAL REFLUX: Status: ACTIVE | Noted: 2018-03-02

## 2021-02-12 ENCOUNTER — TELEPHONE (OUTPATIENT)
Dept: INTERNAL MEDICINE CLINIC | Facility: CLINIC | Age: 66
End: 2021-02-12

## 2021-02-12 NOTE — TELEPHONE ENCOUNTER
Received labs from [facility]. This lab can not be Abstracted. placed in RN bin to review.

## 2021-02-12 NOTE — TELEPHONE ENCOUNTER
Labs received from [facility] dated 2/8/2021 were ordered by oncologist/heme Dr. Santiago Guo from [facility]. Monoclonal spike detected. Protein electrophoresis panel results received.  Patient being managed by Dr. Etienne Bolton.     Dr. Prosper Gaytan listed as Peace Wilkins

## 2021-02-22 ENCOUNTER — OFFICE VISIT (OUTPATIENT)
Dept: INTERNAL MEDICINE CLINIC | Facility: CLINIC | Age: 66
End: 2021-02-22
Payer: COMMERCIAL

## 2021-02-22 VITALS
DIASTOLIC BLOOD PRESSURE: 74 MMHG | BODY MASS INDEX: 24.45 KG/M2 | SYSTOLIC BLOOD PRESSURE: 112 MMHG | TEMPERATURE: 98 F | HEIGHT: 63 IN | HEART RATE: 68 BPM | WEIGHT: 138 LBS

## 2021-02-22 DIAGNOSIS — C88.0 WALDENSTROM MACROGLOBULINEMIA (HCC): ICD-10-CM

## 2021-02-22 DIAGNOSIS — C85.90 NON-HODGKIN'S LYMPHOMA, UNSPECIFIED BODY REGION, UNSPECIFIED NON-HODGKIN LYMPHOMA TYPE (HCC): ICD-10-CM

## 2021-02-22 DIAGNOSIS — R09.81 NASAL CONGESTION: ICD-10-CM

## 2021-02-22 DIAGNOSIS — R22.31 MASS OF RIGHT UPPER EXTREMITY: Primary | ICD-10-CM

## 2021-02-22 PROCEDURE — 3078F DIAST BP <80 MM HG: CPT | Performed by: NURSE PRACTITIONER

## 2021-02-22 PROCEDURE — 99214 OFFICE O/P EST MOD 30 MIN: CPT | Performed by: NURSE PRACTITIONER

## 2021-02-22 PROCEDURE — 3074F SYST BP LT 130 MM HG: CPT | Performed by: NURSE PRACTITIONER

## 2021-02-22 PROCEDURE — 3008F BODY MASS INDEX DOCD: CPT | Performed by: NURSE PRACTITIONER

## 2021-02-23 NOTE — PROGRESS NOTES
Lori Wilson is a 73year old female. Patient presents with:  Lump: AJ rm 10 lumps on arms that has gotten worse      HPI:   Presents for eval of skin masses. History of NHL/lesion, waldenstrom macroglobulinemia.   Presented in 2016 with nasal conge anterior chest     Basal cell carcinoma, arm     Chronic lymphocytic thyroiditis     Common wart     Contact dermatitis and other eczema, due to unspecified cause     Esophageal reflux     Globus sensation     H/O nasal septoplasty     Hypertrophy of nasal Never Used    Alcohol use:  Yes      Alcohol/week: 1.0 standard drinks      Types: 1 Standard drinks or equivalent per week      Frequency: 2-4 times a month      Drinks per session: 1 or 2      Binge frequency: Never      Comment: cage done 7/6/20     Drug orders of the defined types were placed in this encounter.       Meds & Refills for this Visit:  Requested Prescriptions      No prescriptions requested or ordered in this encounter       Imaging & Consults:  SURGERY - INTERNAL  US ARM RIGHT LIMITED (CPT=76

## 2021-02-24 ENCOUNTER — TELEPHONE (OUTPATIENT)
Dept: INTERNAL MEDICINE CLINIC | Facility: CLINIC | Age: 66
End: 2021-02-24

## 2021-02-24 NOTE — TELEPHONE ENCOUNTER
Areas of concern c/w hypoechoic mass with internal vascularity. Will need biopsy  Given Dr Basilia pandey at  earlier this week. Confirm this is the practice she will be seeing so we can fax imaging results as they will not be in Epic.

## 2021-02-25 NOTE — TELEPHONE ENCOUNTER
Patient notified results of US Axilla Right, notified to see Dr Gera Valdes surgeon for evaluation and may need bx. Referral placed. Results from [facility] faxed to Dr Chay Oropeza office at [phone], confirmation received.   Pt verbalizes und

## 2021-02-26 ENCOUNTER — TELEPHONE (OUTPATIENT)
Dept: INTERNAL MEDICINE CLINIC | Facility: CLINIC | Age: 66
End: 2021-02-26

## 2021-02-26 NOTE — TELEPHONE ENCOUNTER
Received fax from [facility] lab with attahed Protein Electrophoresis serum. Placed in RN bin to review.

## 2021-03-01 ENCOUNTER — OFFICE VISIT (OUTPATIENT)
Dept: SURGERY | Facility: CLINIC | Age: 66
End: 2021-03-01
Payer: COMMERCIAL

## 2021-03-01 VITALS
BODY MASS INDEX: 21.99 KG/M2 | HEART RATE: 77 BPM | SYSTOLIC BLOOD PRESSURE: 112 MMHG | DIASTOLIC BLOOD PRESSURE: 76 MMHG | TEMPERATURE: 97 F | HEIGHT: 65 IN | WEIGHT: 132 LBS

## 2021-03-01 DIAGNOSIS — C88.0 WALDENSTROM MACROGLOBULINEMIA (HCC): ICD-10-CM

## 2021-03-01 DIAGNOSIS — Z01.818 PRE-OP TESTING: ICD-10-CM

## 2021-03-01 DIAGNOSIS — D48.5 NEOPLASM OF UNCERTAIN BEHAVIOR OF SKIN: Primary | ICD-10-CM

## 2021-03-01 PROCEDURE — 3074F SYST BP LT 130 MM HG: CPT | Performed by: COLON & RECTAL SURGERY

## 2021-03-01 PROCEDURE — 3008F BODY MASS INDEX DOCD: CPT | Performed by: COLON & RECTAL SURGERY

## 2021-03-01 PROCEDURE — 3078F DIAST BP <80 MM HG: CPT | Performed by: COLON & RECTAL SURGERY

## 2021-03-01 PROCEDURE — 99205 OFFICE O/P NEW HI 60 MIN: CPT | Performed by: COLON & RECTAL SURGERY

## 2021-03-01 NOTE — TELEPHONE ENCOUNTER
Jignesh Puente at [facility] office received these results for Protein electorphoresis already along with other labs and Dr Jeffery Bach has reviewed them already as well.   AMAURYI to SD.

## 2021-03-01 NOTE — H&P
New Patient Visit Note       Active Problems      1. Neoplasm of uncertain behavior of skin    2. Waldenstrom macroglobulinemia ([facility] [address]    3. Pre-op testing        Chief Complaint   Patient presents with:  Mass: MASSES IN RIGHT UPPER ARM.  Has one lump in the manifestations are usually nonspecific such as purpura, ulcers, and urticarial lesions. These lesions are caused by hyperviscosity of the blood, immune complex-mediated vascular damage, paraprotein deposition, and amyloid deposition.  Specific skin lesions 02/03/16    waldenstonstroms macro globulen anemia   • Personal history of antineoplastic chemotherapy    • Stool incontinence    • Syncope 12/01/17   • Thyroid disease    • Usual hyperplasia of lactiferous duct 2019   • Wears glasses      Past Surgical Hi Exercise: Yes          3x per week       Current Outpatient Medications:   •  cholestyramine light 4 g Oral Powd Pack, Take 1 packet (4 g total) by mouth 2 (two) times daily. Please start 1 packet daily.  If no response in 7 days then increase to twice lb (59.9 kg)   LMP  (LMP Unknown)   BMI 21.97 kg/m²   Physical Exam   Musculoskeletal:        Arms:       Comments: Clinical exam reveals the best place for biopsy to be the right upper extremity.   Just above the elbow crease on the lateral aspect of the u monoclonal proliferation of lymphoplasmacytes in the bone marrow, lymph nodes, and spleen. Increased levels of circulating IgM monoclonal antibodies lead to their deposition in the skin and other organs.     Cutaneous manifestations of WM are rare and can b tissues. They are so tightly packed, excisional therapy would be difficult because we would have to actually cut through portions of the skin and subcutaneous tissues that are affected by this disease. There is no overlying cellulitis.   There is no pain

## 2021-03-01 NOTE — TELEPHONE ENCOUNTER
TE 2/12/2021. Dr. Juno Tian office were notified of lab order corrected with JL name removed. Lab was also contacted whom instructed this RN that Austin Kamara would be removed.  We received new corrected report that lists Austin Kamara still as ordering MD. Contact lab at [phone]

## 2021-03-02 ENCOUNTER — LAB ENCOUNTER (OUTPATIENT)
Dept: LAB | Age: 66
End: 2021-03-02
Attending: COLON & RECTAL SURGERY
Payer: COMMERCIAL

## 2021-03-02 DIAGNOSIS — Z01.818 PRE-OP TESTING: ICD-10-CM

## 2021-03-02 NOTE — PATIENT INSTRUCTIONS
I am seeing this patient at the request of the oncology service regarding some cutaneous lesions that are potentially related to this patient's Natalie Deyanira's macroglobulinemia. She first noticed the lump 6 months ago. She has had an ultrasound.   It did manifestations of the disease, as well as disease transformation to high-grade, large cell lymphoma are rare. This patient has had some type of a complication of the macroglobulinemia within the soft palate of her mouth and throat.   She underwent an uvu

## 2021-03-04 ENCOUNTER — APPOINTMENT (OUTPATIENT)
Dept: DERMATOLOGY | Age: 66
End: 2021-03-04

## 2021-03-04 LAB — SARS-COV-2 RNA RESP QL NAA+PROBE: NOT DETECTED

## 2021-03-05 ENCOUNTER — LAB REQUISITION (OUTPATIENT)
Dept: LAB | Facility: HOSPITAL | Age: 66
End: 2021-03-05
Payer: COMMERCIAL

## 2021-03-05 DIAGNOSIS — D48.5 NEOPLASM OF UNCERTAIN BEHAVIOR OF SKIN: ICD-10-CM

## 2021-03-05 PROCEDURE — 88305 TISSUE EXAM BY PATHOLOGIST: CPT | Performed by: COLON & RECTAL SURGERY

## 2021-03-05 PROCEDURE — 88341 IMHCHEM/IMCYTCHM EA ADD ANTB: CPT | Performed by: COLON & RECTAL SURGERY

## 2021-03-05 PROCEDURE — 88365 INSITU HYBRIDIZATION (FISH): CPT | Performed by: COLON & RECTAL SURGERY

## 2021-03-05 PROCEDURE — 88307 TISSUE EXAM BY PATHOLOGIST: CPT | Performed by: COLON & RECTAL SURGERY

## 2021-03-05 PROCEDURE — 88342 IMHCHEM/IMCYTCHM 1ST ANTB: CPT | Performed by: COLON & RECTAL SURGERY

## 2021-03-12 ENCOUNTER — TELEPHONE (OUTPATIENT)
Dept: SURGERY | Facility: CLINIC | Age: 66
End: 2021-03-12

## 2021-03-12 DIAGNOSIS — Z23 NEED FOR VACCINATION: ICD-10-CM

## 2021-03-12 NOTE — TELEPHONE ENCOUNTER
----- Message from Jane Allan sent at 3/8/2021  6:26 PM CST -----  Regarding: Non-Urgent Medical Question  Contact: [phone]  Hi, I’m just curious, my surgery was Friday and my arm is still numb.   It’s from my elbow to my wrist.  Wondering what’s

## 2021-03-15 ENCOUNTER — OFFICE VISIT (OUTPATIENT)
Dept: SURGERY | Facility: CLINIC | Age: 66
End: 2021-03-15

## 2021-03-15 VITALS — BODY MASS INDEX: 21.99 KG/M2 | HEIGHT: 65 IN | WEIGHT: 132 LBS | TEMPERATURE: 97 F

## 2021-03-15 DIAGNOSIS — C88.0 WALDENSTROM MACROGLOBULINEMIA (HCC): ICD-10-CM

## 2021-03-15 DIAGNOSIS — C85.18 B-CELL LYMPHOMA OF LYMPH NODES OF MULTIPLE REGIONS, UNSPECIFIED B-CELL LYMPHOMA TYPE (HCC): Primary | ICD-10-CM

## 2021-03-15 PROCEDURE — 99024 POSTOP FOLLOW-UP VISIT: CPT | Performed by: COLON & RECTAL SURGERY

## 2021-03-15 PROCEDURE — 3008F BODY MASS INDEX DOCD: CPT | Performed by: COLON & RECTAL SURGERY

## 2021-03-15 NOTE — PROGRESS NOTES
Post Operative Visit Note       Active Problems  1.  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 Waldenstrom macroglobulinemia ([address]         Chief Complaint   Patient presents with:  Post-Op: po EXCISION Pathologist:           Mitzi Healy MD                                                              Specimen:    Arm, right, Arm, right subcutaneous lesion UPPER GI ENDOSCOPY,EXAM         The family history and social history have been reviewed by me today.     Family History   Problem Relation Age of Onset   • Hypertension Maternal Grandmother    • Stroke Maternal Grandmother    • Heart Disease Mother Ex-Partner:       Emotionally Abused:       Physically Abused:       Sexually Abused:      Current Outpatient Medications:   •  PREVALITE 4 g Oral Powd Pack, TAKE 1 PACKET (4 G TOTAL) BY MOUTH 2 (TWO) TIMES DAILY. PLEASE START 1 PACKET DAILY.  IF NO RESPONS disturbance. Physical Findings   Temp 96.9 °F (36.1 °C)   Ht 65\"   Wt 132 lb (59.9 kg)   LMP  (LMP Unknown)   BMI 21.97 kg/m²   Physical Exam  Vitals and nursing note reviewed.    Skin:     Comments: Clinical exam reveals the right arm incision to be see her upon her return from [place]. She may need a PowerPort. I will defer decision making up to the oncology team.  If they want me to put it in, I would be happy to do it. I will see her 1 more time when she returns from [place] for a wound check.

## 2021-03-15 NOTE — PATIENT INSTRUCTIONS
This patient underwent wide local excision of a skin lesion of the right upper arm on March 5, 2021. The patient has Talha Drop macroglobulinemia. She has cutaneous lesions. This was very concerning for degeneration into lymphoma.   She has had B-miroslava

## 2021-03-22 ENCOUNTER — OFFICE VISIT (OUTPATIENT)
Dept: SURGERY | Facility: CLINIC | Age: 66
End: 2021-03-22

## 2021-03-22 VITALS
WEIGHT: 132 LBS | TEMPERATURE: 97 F | DIASTOLIC BLOOD PRESSURE: 75 MMHG | SYSTOLIC BLOOD PRESSURE: 106 MMHG | BODY MASS INDEX: 21.99 KG/M2 | HEIGHT: 65 IN | HEART RATE: 69 BPM

## 2021-03-22 DIAGNOSIS — C88.0 WALDENSTROM MACROGLOBULINEMIA (HCC): ICD-10-CM

## 2021-03-22 DIAGNOSIS — T81.49XA POSTOPERATIVE WOUND INFECTION: Primary | ICD-10-CM

## 2021-03-22 PROCEDURE — 99024 POSTOP FOLLOW-UP VISIT: CPT | Performed by: PHYSICIAN ASSISTANT

## 2021-03-22 PROCEDURE — 3008F BODY MASS INDEX DOCD: CPT | Performed by: PHYSICIAN ASSISTANT

## 2021-03-22 PROCEDURE — 3074F SYST BP LT 130 MM HG: CPT | Performed by: PHYSICIAN ASSISTANT

## 2021-03-22 PROCEDURE — 3078F DIAST BP <80 MM HG: CPT | Performed by: PHYSICIAN ASSISTANT

## 2021-03-22 RX ORDER — CLINDAMYCIN HYDROCHLORIDE 300 MG/1
CAPSULE ORAL
COMMUNITY
Start: 2021-03-19 | End: 2021-07-26 | Stop reason: ALTCHOICE

## 2021-03-22 NOTE — PROGRESS NOTES
Post Operative Visit Note       Active Problems  1. Postoperative wound infection    2. Waldenstrom macroglobulinemia [facility])         Chief Complaint   Patient presents with:  Post-Op: wound check right arm, still has stiches and looks infected.  PT states th 2019   • Wears glasses    • Weight gain      Past Surgical History:   Procedure Laterality Date   • COLONOSCOPY     • COLONOSCOPY  03/08/2018    repeat 10years Dr. Emre Francois, CERVIX W/UPPER ADJACENT VAGINA; 2021 [place] CURETTAGE  2005   • Transportation Needs:       Lack of Transportation (Medical):       Lack of Transportation (Non-Medical):   Physical Activity:       Days of Exercise per Week:       Minutes of Exercise per Session:   Stress:       Feeling of Stress :   Social Connection breath and wheezing. Cardiovascular: Negative for chest pain, palpitations and leg swelling. Gastrointestinal: Negative for abdominal distention, abdominal pain, anal bleeding, blood in stool, constipation, diarrhea, nausea and vomiting.    Danielle Reyes with any new or worsening symptoms. No orders of the defined types were placed in this encounter. Imaging & Referrals   None    Follow Up  Return in 1 week (on 3/29/2021).     Wiley Strauss PA-C

## 2021-03-22 NOTE — PATIENT INSTRUCTIONS
The patient presents today for continued care and evaluation after undergoing a wide local excision of a skin lesion on the right upper arm on March 5, 2021. This was found to be a low-grade non-Hodgkin's B cell lymphoma.   She has a known history of B-miroslava

## 2021-03-29 ENCOUNTER — OFFICE VISIT (OUTPATIENT)
Dept: SURGERY | Facility: CLINIC | Age: 66
End: 2021-03-29

## 2021-03-29 VITALS
SYSTOLIC BLOOD PRESSURE: 110 MMHG | DIASTOLIC BLOOD PRESSURE: 75 MMHG | HEART RATE: 70 BPM | HEIGHT: 65 IN | TEMPERATURE: 96 F | BODY MASS INDEX: 21.99 KG/M2 | WEIGHT: 132 LBS

## 2021-03-29 DIAGNOSIS — T81.49XA POSTOPERATIVE WOUND INFECTION: Primary | ICD-10-CM

## 2021-03-29 PROCEDURE — 3008F BODY MASS INDEX DOCD: CPT | Performed by: PHYSICIAN ASSISTANT

## 2021-03-29 PROCEDURE — 99024 POSTOP FOLLOW-UP VISIT: CPT | Performed by: PHYSICIAN ASSISTANT

## 2021-03-29 PROCEDURE — 3078F DIAST BP <80 MM HG: CPT | Performed by: PHYSICIAN ASSISTANT

## 2021-03-29 PROCEDURE — 3074F SYST BP LT 130 MM HG: CPT | Performed by: PHYSICIAN ASSISTANT

## 2021-03-29 RX ORDER — CLINDAMYCIN HYDROCHLORIDE 300 MG/1
300 CAPSULE ORAL 3 TIMES DAILY
Qty: 21 CAPSULE | Refills: 0 | Status: SHIPPED | OUTPATIENT
Start: 2021-03-29 | End: 2021-04-05

## 2021-03-29 NOTE — PATIENT INSTRUCTIONS
The patient presents today for continued care and evaluation of a wound infection on her arm following an excisional biopsy. The patient states that she feels that her arm is feeling much better from her last visit.   She states she still has some draini

## 2021-03-29 NOTE — PROGRESS NOTES
Post Operative Visit Note       Active Problems  1. Postoperative wound infection         Chief Complaint   Patient presents with:  Post-Op: p/o wound check right arm - Pt. states the wound is still oozing.  Pt. states when she is home, she keeps the wound gain      Past Surgical History:   Procedure Laterality Date   • COLONOSCOPY     • COLONOSCOPY  03/08/2018    repeat 10years Dr. Army Urena, CERVIX W/UPPER ADJACENT VAGINA; W/ENDOCERVICAL CURETTAGE  2005   • EXCISIONAL BIOPSY LEFT  12/2019 Year:       Ran Out of Food in the Last Year:   Transportation Needs:       Lack of Transportation (Medical):       Lack of Transportation (Non-Medical):   Physical Activity:       Days of Exercise per Week:       Minutes of Exercise per Session:   Stress: hearing loss, nosebleeds, sore throat and trouble swallowing. Respiratory: Negative for apnea, cough, shortness of breath and wheezing. Cardiovascular: Negative for chest pain, palpitations and leg swelling.    Gastrointestinal: Negative for abdominal from the incision. She may keep a piece of gauze over the site to collect the drainage. I will see the patient again in 2 weeks for continued care and evaluation of her wound.   If she develops any new or worsening symptoms prior to then she should cont

## 2021-04-01 DIAGNOSIS — R19.7 DIARRHEA, UNSPECIFIED TYPE: Primary | ICD-10-CM

## 2021-04-01 PROCEDURE — 87493 C DIFF AMPLIFIED PROBE: CPT

## 2021-04-02 ENCOUNTER — LAB ENCOUNTER (OUTPATIENT)
Dept: LAB | Facility: HOSPITAL | Age: 66
End: 2021-04-02
Attending: PHYSICIAN ASSISTANT
Payer: COMMERCIAL

## 2021-04-02 DIAGNOSIS — R19.7 DIARRHEA, UNSPECIFIED TYPE: ICD-10-CM

## 2021-04-19 ENCOUNTER — OFFICE VISIT (OUTPATIENT)
Dept: SURGERY | Facility: CLINIC | Age: 66
End: 2021-04-19

## 2021-04-19 VITALS — DIASTOLIC BLOOD PRESSURE: 81 MMHG | SYSTOLIC BLOOD PRESSURE: 125 MMHG | TEMPERATURE: 97 F | HEART RATE: 73 BPM

## 2021-04-19 DIAGNOSIS — T81.49XA POSTOPERATIVE WOUND INFECTION: Primary | ICD-10-CM

## 2021-04-19 PROCEDURE — 99024 POSTOP FOLLOW-UP VISIT: CPT | Performed by: PHYSICIAN ASSISTANT

## 2021-04-19 PROCEDURE — 3079F DIAST BP 80-89 MM HG: CPT | Performed by: PHYSICIAN ASSISTANT

## 2021-04-19 PROCEDURE — 3074F SYST BP LT 130 MM HG: CPT | Performed by: PHYSICIAN ASSISTANT

## 2021-04-19 NOTE — PROGRESS NOTES
Post Operative Visit Note       Active Problems  1. Postoperative wound infection         Chief Complaint   Patient presents with:  Post-Op: p/o wound check right arm.           History of Present Illness   The patient presents today for continued care and 12/2019    radial scar and usual ductal hyperplasia   • OTHER SURGICAL HISTORY  01/2010     Nasal surgery   • OTHER SURGICAL HISTORY      turbinectomy   • OTHER SURGICAL HISTORY  02/2016    uvula removed   • OTHER SURGICAL HISTORY  12/05/2019    Wire loc a daily., Disp: , Rfl:   •  Omega-3 Fatty Acids (FISH OIL OR), Take by mouth., Disp: , Rfl:   •  Levothyroxine Sodium (SYNTHROID) 50 MCG Oral Tab, TAKE 1 TABLET BY MOUTH EVERY DAY, Disp: 90 tablet, Rfl: 3  •  Calcium Carbonate (CALCIUM 500 OR), Take  by mout infection. The patient has no further restrictions at this time. All the patient's questions were answered. The patient verbalized understanding agreement plan of care. I have no further follow-up scheduled with this patient at this time.   This pat

## 2021-04-19 NOTE — PATIENT INSTRUCTIONS
The patient presents today for continued care and evaluation regarding her postoperative infection of her right arm skin lesion excision. The patient states that she is feeling very well at this time. The wound has completely healed.   There is no more

## 2021-07-26 ENCOUNTER — OFFICE VISIT (OUTPATIENT)
Dept: INTERNAL MEDICINE CLINIC | Facility: CLINIC | Age: 66
End: 2021-07-26
Payer: COMMERCIAL

## 2021-07-26 VITALS
SYSTOLIC BLOOD PRESSURE: 114 MMHG | DIASTOLIC BLOOD PRESSURE: 62 MMHG | HEIGHT: 65 IN | TEMPERATURE: 98 F | WEIGHT: 133 LBS | BODY MASS INDEX: 22.16 KG/M2 | HEART RATE: 88 BPM

## 2021-07-26 DIAGNOSIS — R82.90 CLOUDY URINE: ICD-10-CM

## 2021-07-26 DIAGNOSIS — R10.13 DYSPEPSIA: Primary | ICD-10-CM

## 2021-07-26 LAB
BILIRUB UR QL STRIP.AUTO: NEGATIVE
CLARITY UR REFRACT.AUTO: CLEAR
GLUCOSE UR STRIP.AUTO-MCNC: NEGATIVE MG/DL
KETONES UR STRIP.AUTO-MCNC: NEGATIVE MG/DL
NITRITE UR QL STRIP.AUTO: NEGATIVE
PH UR STRIP.AUTO: 7 [PH] (ref 5–8)
PROT UR STRIP.AUTO-MCNC: NEGATIVE MG/DL
RBC UR QL AUTO: NEGATIVE
SP GR UR STRIP.AUTO: 1 (ref 1–1.03)
UROBILINOGEN UR STRIP.AUTO-MCNC: <2 MG/DL

## 2021-07-26 PROCEDURE — 99214 OFFICE O/P EST MOD 30 MIN: CPT | Performed by: NURSE PRACTITIONER

## 2021-07-26 PROCEDURE — 87086 URINE CULTURE/COLONY COUNT: CPT | Performed by: NURSE PRACTITIONER

## 2021-07-26 PROCEDURE — 3074F SYST BP LT 130 MM HG: CPT | Performed by: NURSE PRACTITIONER

## 2021-07-26 PROCEDURE — 3008F BODY MASS INDEX DOCD: CPT | Performed by: NURSE PRACTITIONER

## 2021-07-26 PROCEDURE — 3078F DIAST BP <80 MM HG: CPT | Performed by: NURSE PRACTITIONER

## 2021-07-26 PROCEDURE — 81001 URINALYSIS AUTO W/SCOPE: CPT | Performed by: NURSE PRACTITIONER

## 2021-07-26 RX ORDER — ACYCLOVIR 400 MG/1
400 TABLET ORAL DAILY
COMMUNITY

## 2021-07-26 RX ORDER — PANTOPRAZOLE SODIUM 40 MG/1
40 TABLET, DELAYED RELEASE ORAL
Qty: 90 TABLET | Refills: 1 | Status: SHIPPED | OUTPATIENT
Start: 2021-07-26 | End: 2022-01-14

## 2021-07-26 RX ORDER — OMEPRAZOLE 20 MG/1
20 CAPSULE, DELAYED RELEASE ORAL
COMMUNITY
End: 2021-12-17 | Stop reason: ALTCHOICE

## 2021-07-26 NOTE — PROGRESS NOTES
Natalee Sy is a 73year old female. Patient presents with:  Gastro-esophageal Reflux: AJ rm 10 GERD      HPI:   Here for not feeling well a few weeks ago but now better. She was experiencing chest pain  Left sided. Mini stabbing pains.    Not rel infection    Current Outpatient Medications   Medication Sig Dispense Refill   • omeprazole 20 MG Oral Capsule Delayed Release Take 20 mg by mouth every morning before breakfast.     • acyclovir 400 MG Oral Tab Take 400 mg by mouth daily.      • Pantoprazol Age of Onset   • Hypertension Maternal Grandmother    • Stroke Maternal Grandmother    • Heart Disease Mother         hardening of the arteries   • Cancer Mother         breast/bone   • Breast Cancer Mother 74        78   • Other (MS) Sister             •

## 2021-08-16 ENCOUNTER — PATIENT MESSAGE (OUTPATIENT)
Dept: INTERNAL MEDICINE CLINIC | Facility: CLINIC | Age: 66
End: 2021-08-16

## 2021-08-16 DIAGNOSIS — R31.9 HEMATURIA, UNSPECIFIED TYPE: Primary | ICD-10-CM

## 2021-08-17 NOTE — TELEPHONE ENCOUNTER
LOV 7/26/21    Pt reports yesterday had one episode of bright red blood in urine. Reports it changed the color of the urine, did not appear separate from the urine. Has not happened since and was not having BM at that time.      Denies abd pain, n/v, fever,

## 2021-08-17 NOTE — TELEPHONE ENCOUNTER
UA with reflex culture ordered. [facility] is preferred lab. Please refer to Dr Aileen Natarajan and Dr Tom Freire.

## 2021-08-17 NOTE — TELEPHONE ENCOUNTER
From: Prashant File  To:  SRIKANTH Boss  Sent: 8/16/2021 7:20 PM CDT  Subject: Non-Urgent Medical Question    Saji Freed,     So I know my tests came back negative at my last visit and I stopped the medication re: a UTI but I had an episode today w

## 2021-08-19 LAB
APPEARANCE: CLEAR
BILIRUBIN: NEGATIVE
COLOR: YELLOW
GLUCOSE: NEGATIVE
KETONES: NEGATIVE
LEUKOCYTE ESTERASE: NEGATIVE
NITRITE: NEGATIVE
OCCULT BLOOD: NEGATIVE
PH: 7 (ref 5–8)
PROTEIN: NEGATIVE
SPECIFIC GRAVITY: 1.01 (ref 1–1.03)

## 2021-09-07 ENCOUNTER — OFFICE VISIT (OUTPATIENT)
Dept: DERMATOLOGY | Age: 66
End: 2021-09-07

## 2021-09-07 DIAGNOSIS — L82.1 SEBORRHEIC KERATOSIS: ICD-10-CM

## 2021-09-07 DIAGNOSIS — C44.612 BASAL CELL CARCINOMA (BCC) OF RIGHT SHOULDER: Primary | ICD-10-CM

## 2021-09-07 DIAGNOSIS — C44.619 BASAL CELL CARCINOMA (BCC) OF LEFT SHOULDER: ICD-10-CM

## 2021-09-07 DIAGNOSIS — L57.0 AK (ACTINIC KERATOSIS): ICD-10-CM

## 2021-09-07 PROCEDURE — 11103 TANGNTL BX SKIN EA SEP/ADDL: CPT | Performed by: DERMATOLOGY

## 2021-09-07 PROCEDURE — 11102 TANGNTL BX SKIN SINGLE LES: CPT | Performed by: DERMATOLOGY

## 2021-09-07 PROCEDURE — 99213 OFFICE O/P EST LOW 20 MIN: CPT | Performed by: DERMATOLOGY

## 2021-09-07 PROCEDURE — 17000 DESTRUCT PREMALG LESION: CPT | Performed by: DERMATOLOGY

## 2021-09-07 RX ORDER — PANTOPRAZOLE SODIUM 40 MG/1
40 TABLET, DELAYED RELEASE ORAL
COMMUNITY
Start: 2021-07-26

## 2021-09-07 RX ORDER — CURCUMIN 100 %
POWDER (GRAM) MISCELLANEOUS
COMMUNITY

## 2021-09-07 RX ORDER — ACYCLOVIR 400 MG/1
400 TABLET ORAL DAILY
COMMUNITY
Start: 2021-06-23

## 2021-09-07 RX ORDER — CHOLESTYRAMINE LIGHT 4 G/5.7G
4 POWDER, FOR SUSPENSION ORAL DAILY
COMMUNITY

## 2021-09-07 RX ORDER — CEPHALEXIN 500 MG/1
500 CAPSULE ORAL
COMMUNITY
Start: 2021-07-27

## 2021-09-10 LAB — PATH REPORT PLASRBC-IMP: NORMAL

## 2021-09-14 ENCOUNTER — TELEPHONE (OUTPATIENT)
Dept: INTERNAL MEDICINE CLINIC | Facility: CLINIC | Age: 66
End: 2021-09-14

## 2021-09-15 ENCOUNTER — OFFICE VISIT (OUTPATIENT)
Dept: DERMATOLOGY | Age: 66
End: 2021-09-15

## 2021-09-15 DIAGNOSIS — C44.611: Primary | ICD-10-CM

## 2021-09-15 PROCEDURE — 17261 DSTRJ MAL LES T/A/L .6-1.0CM: CPT | Performed by: DERMATOLOGY

## 2021-11-09 ENCOUNTER — OFFICE VISIT (OUTPATIENT)
Dept: DERMATOLOGY | Age: 66
End: 2021-11-09

## 2021-11-09 DIAGNOSIS — L71.9 ACNE ROSACEA: ICD-10-CM

## 2021-11-09 DIAGNOSIS — L82.0 INFLAMED SEBORRHEIC KERATOSIS: Primary | ICD-10-CM

## 2021-11-09 PROCEDURE — 99214 OFFICE O/P EST MOD 30 MIN: CPT | Performed by: PHYSICIAN ASSISTANT

## 2021-11-09 PROCEDURE — 17110 DESTRUCTION B9 LES UP TO 14: CPT | Performed by: PHYSICIAN ASSISTANT

## 2021-11-09 RX ORDER — AZELAIC ACID 0.15 G/G
GEL TOPICAL
Qty: 50 G | Refills: 3 | Status: SHIPPED | OUTPATIENT
Start: 2021-11-09

## 2021-11-17 ENCOUNTER — APPOINTMENT (OUTPATIENT)
Dept: GENERAL RADIOLOGY | Age: 66
End: 2021-11-17
Attending: EMERGENCY MEDICINE
Payer: COMMERCIAL

## 2021-11-17 ENCOUNTER — HOSPITAL ENCOUNTER (EMERGENCY)
Age: 66
Discharge: HOME | End: 2021-11-17
Attending: EMERGENCY MEDICINE
Payer: COMMERCIAL

## 2021-11-17 VITALS
HEART RATE: 63 BPM | RESPIRATION RATE: 16 BRPM | BODY MASS INDEX: 23.05 KG/M2 | HEIGHT: 64 IN | WEIGHT: 135 LBS | TEMPERATURE: 98 F | OXYGEN SATURATION: 98 % | SYSTOLIC BLOOD PRESSURE: 116 MMHG | DIASTOLIC BLOOD PRESSURE: 72 MMHG

## 2021-11-17 DIAGNOSIS — R07.89 CHEST PAIN, ATYPICAL: Primary | ICD-10-CM

## 2021-11-17 PROCEDURE — 85025 COMPLETE CBC W/AUTO DIFF WBC: CPT | Performed by: EMERGENCY MEDICINE

## 2021-11-17 PROCEDURE — 99284 EMERGENCY DEPT VISIT MOD MDM: CPT

## 2021-11-17 PROCEDURE — 93005 ELECTROCARDIOGRAM TRACING: CPT

## 2021-11-17 PROCEDURE — 93010 ELECTROCARDIOGRAM REPORT: CPT

## 2021-11-17 PROCEDURE — 36415 COLL VENOUS BLD VENIPUNCTURE: CPT

## 2021-11-17 PROCEDURE — 80053 COMPREHEN METABOLIC PANEL: CPT | Performed by: EMERGENCY MEDICINE

## 2021-11-17 PROCEDURE — 85379 FIBRIN DEGRADATION QUANT: CPT | Performed by: EMERGENCY MEDICINE

## 2021-11-17 PROCEDURE — 71045 X-RAY EXAM CHEST 1 VIEW: CPT | Performed by: EMERGENCY MEDICINE

## 2021-11-17 PROCEDURE — 84484 ASSAY OF TROPONIN QUANT: CPT | Performed by: EMERGENCY MEDICINE

## 2021-11-17 RX ORDER — ACETAMINOPHEN 500 MG
1000 TABLET ORAL ONCE
Status: COMPLETED | OUTPATIENT
Start: 2021-11-17 | End: 2021-11-17

## 2021-11-17 NOTE — ED PROVIDER NOTES
Patient Seen in: [facility] Emergency Department In [place]      History   Patient presents with:  Chest Pain Angina    Stated Complaint: intermittent l sided chest pain for a couple of weeks - today pain in now radia*    Subjective:   HPI    78-year-old turbinectomy   • OTHER SURGICAL HISTORY  02/2016    uvula removed   • OTHER SURGICAL HISTORY  12/05/2019    Wire loc and exc left breast mass   • OTHER SURGICAL HISTORY  03/05/2021    EXCISION OF CYST ON RIGHT ARM - DJP    • TONSILLECTOMY     • UPPER GI EN Musculoskeletal:         General: Normal range of motion. Cervical back: Neck supple. Skin:     General: Skin is warm. Neurological:      General: No focal deficit present. Mental Status: She is alert.       Cranial Nerves: No cranial nerve intermittent over the last 2 weeks. Today pain is radiating to her left upper back. FINDINGS:  Cardiac size and pulmonary vasculature are within normal limits. No pleural effusions. No pneumothorax. Hyperexpansion of the lungs.              CONCLUSION: evaluate this chest pain. I offered to call cardiology and case management in order for her to obtain outpatient stress testing, however patient declined, preferring discharge home at this time.   I counseled her and daughter on emergency department return

## 2021-11-29 ENCOUNTER — LAB ENCOUNTER (OUTPATIENT)
Dept: LAB | Age: 66
End: 2021-11-29
Attending: INTERNAL MEDICINE
Payer: COMMERCIAL

## 2021-11-29 DIAGNOSIS — Z11.59 SCREENING FOR VIRAL DISEASE: ICD-10-CM

## 2021-12-02 ENCOUNTER — HOSPITAL ENCOUNTER (OUTPATIENT)
Dept: CV DIAGNOSTICS | Facility: HOSPITAL | Age: 66
Discharge: HOME | End: 2021-12-02
Attending: INTERNAL MEDICINE
Payer: COMMERCIAL

## 2021-12-02 ENCOUNTER — HOSPITAL ENCOUNTER (OUTPATIENT)
Dept: LAB | Facility: HOSPITAL | Age: 66
Discharge: HOME | End: 2021-12-02
Attending: INTERNAL MEDICINE
Payer: COMMERCIAL

## 2021-12-02 DIAGNOSIS — R07.9 CHEST PAIN, UNSPECIFIED: ICD-10-CM

## 2021-12-02 PROCEDURE — 36415 COLL VENOUS BLD VENIPUNCTURE: CPT | Performed by: INTERNAL MEDICINE

## 2021-12-02 PROCEDURE — 93350 STRESS TTE ONLY: CPT | Performed by: INTERNAL MEDICINE

## 2021-12-02 PROCEDURE — 93017 CV STRESS TEST TRACING ONLY: CPT | Performed by: INTERNAL MEDICINE

## 2021-12-02 PROCEDURE — 80061 LIPID PANEL: CPT | Performed by: INTERNAL MEDICINE

## 2021-12-02 PROCEDURE — 93018 CV STRESS TEST I&R ONLY: CPT | Performed by: INTERNAL MEDICINE

## 2021-12-17 RX ORDER — BUDESONIDE 0.5 MG/2ML
0.5 INHALANT ORAL DAILY
COMMUNITY

## 2021-12-21 ENCOUNTER — HOSPITAL ENCOUNTER (OUTPATIENT)
Dept: MRI IMAGING | Facility: HOSPITAL | Age: 66
Discharge: HOME | End: 2021-12-21
Attending: INTERNAL MEDICINE
Payer: COMMERCIAL

## 2021-12-21 DIAGNOSIS — R19.7 DIARRHEA IN ADULT PATIENT: ICD-10-CM

## 2021-12-21 DIAGNOSIS — R93.89 ABNORMAL FINDING ON IMAGING: ICD-10-CM

## 2021-12-21 PROCEDURE — 74183 MRI ABD W/O CNTR FLWD CNTR: CPT | Performed by: INTERNAL MEDICINE

## 2021-12-21 PROCEDURE — A9575 INJ GADOTERATE MEGLUMI 0.1ML: HCPCS | Performed by: INTERNAL MEDICINE

## 2021-12-21 PROCEDURE — 72197 MRI PELVIS W/O & W/DYE: CPT | Performed by: INTERNAL MEDICINE

## 2021-12-27 ENCOUNTER — LAB ENCOUNTER (OUTPATIENT)
Dept: LAB | Facility: HOSPITAL | Age: 66
End: 2021-12-27
Attending: INTERNAL MEDICINE
Payer: COMMERCIAL

## 2021-12-27 DIAGNOSIS — R93.89 ABNORMAL FINDING ON IMAGING: ICD-10-CM

## 2021-12-27 LAB — SARS-COV-2 RNA RESP QL NAA+PROBE: NOT DETECTED

## 2021-12-28 ENCOUNTER — ANESTHESIA (OUTPATIENT)
Dept: ENDOSCOPY | Facility: HOSPITAL | Age: 66
End: 2021-12-28
Payer: COMMERCIAL

## 2021-12-28 ENCOUNTER — ANESTHESIA EVENT (OUTPATIENT)
Dept: ENDOSCOPY | Facility: HOSPITAL | Age: 66
End: 2021-12-28
Payer: COMMERCIAL

## 2021-12-28 ENCOUNTER — HOSPITAL ENCOUNTER (OUTPATIENT)
Facility: HOSPITAL | Age: 66
Setting detail: HOSPITAL OUTPATIENT SURGERY
Discharge: HOME | End: 2021-12-28
Attending: INTERNAL MEDICINE | Admitting: INTERNAL MEDICINE
Payer: COMMERCIAL

## 2021-12-28 VITALS
OXYGEN SATURATION: 100 % | RESPIRATION RATE: 18 BRPM | DIASTOLIC BLOOD PRESSURE: 63 MMHG | HEIGHT: 64.5 IN | TEMPERATURE: 99 F | SYSTOLIC BLOOD PRESSURE: 108 MMHG | WEIGHT: 131 LBS | BODY MASS INDEX: 22.09 KG/M2 | HEART RATE: 89 BPM

## 2021-12-28 DIAGNOSIS — R93.89 ABNORMAL FINDING ON IMAGING: Primary | ICD-10-CM

## 2021-12-28 DIAGNOSIS — R19.7 DIARRHEA IN ADULT PATIENT: ICD-10-CM

## 2021-12-28 PROCEDURE — 88305 TISSUE EXAM BY PATHOLOGIST: CPT | Performed by: INTERNAL MEDICINE

## 2021-12-28 PROCEDURE — 0DBK8ZX EXCISION OF ASCENDING COLON, VIA NATURAL OR ARTIFICIAL OPENING ENDOSCOPIC, DIAGNOSTIC: ICD-10-PCS | Performed by: INTERNAL MEDICINE

## 2021-12-28 PROCEDURE — 88365 INSITU HYBRIDIZATION (FISH): CPT | Performed by: INTERNAL MEDICINE

## 2021-12-28 PROCEDURE — 0DBE8ZX EXCISION OF LARGE INTESTINE, VIA NATURAL OR ARTIFICIAL OPENING ENDOSCOPIC, DIAGNOSTIC: ICD-10-PCS | Performed by: INTERNAL MEDICINE

## 2021-12-28 PROCEDURE — 0DBN8ZX EXCISION OF SIGMOID COLON, VIA NATURAL OR ARTIFICIAL OPENING ENDOSCOPIC, DIAGNOSTIC: ICD-10-PCS | Performed by: INTERNAL MEDICINE

## 2021-12-28 PROCEDURE — 81305 MYD88 GENE P.LEU265PRO VRNT: CPT

## 2021-12-28 PROCEDURE — 88341 IMHCHEM/IMCYTCHM EA ADD ANTB: CPT | Performed by: INTERNAL MEDICINE

## 2021-12-28 PROCEDURE — 36415 COLL VENOUS BLD VENIPUNCTURE: CPT

## 2021-12-28 PROCEDURE — 88342 IMHCHEM/IMCYTCHM 1ST ANTB: CPT | Performed by: INTERNAL MEDICINE

## 2021-12-28 RX ORDER — SODIUM CHLORIDE, SODIUM LACTATE, POTASSIUM CHLORIDE, CALCIUM CHLORIDE 600; 310; 30; 20 MG/100ML; MG/100ML; MG/100ML; MG/100ML
INJECTION, SOLUTION INTRAVENOUS CONTINUOUS
Status: DISCONTINUED | OUTPATIENT
Start: 2021-12-28 | End: 2021-12-28

## 2021-12-28 RX ORDER — NALOXONE HYDROCHLORIDE 0.4 MG/ML
80 INJECTION, SOLUTION INTRAMUSCULAR; INTRAVENOUS; SUBCUTANEOUS AS NEEDED
Status: DISCONTINUED | OUTPATIENT
Start: 2021-12-28 | End: 2021-12-28

## 2021-12-28 RX ORDER — LIDOCAINE HYDROCHLORIDE 20 MG/ML
INJECTION, SOLUTION EPIDURAL; INFILTRATION; INTRACAUDAL; PERINEURAL AS NEEDED
Status: DISCONTINUED | OUTPATIENT
Start: 2021-12-28 | End: 2021-12-28 | Stop reason: SURG

## 2021-12-28 RX ADMIN — LIDOCAINE HYDROCHLORIDE 50 MG: 20 INJECTION, SOLUTION EPIDURAL; INFILTRATION; INTRACAUDAL; PERINEURAL at 13:33:00

## 2021-12-28 RX ADMIN — SODIUM CHLORIDE, SODIUM LACTATE, POTASSIUM CHLORIDE, CALCIUM CHLORIDE: 600; 310; 30; 20 INJECTION, SOLUTION INTRAVENOUS at 14:06:00

## 2021-12-28 NOTE — OPERATIVE REPORT
Maeve Weinstein Patient Status:  Hospital Outpatient Surgery    1955 MRN [id number]   Location [address] [place] [address] Attending Isaías Norris MD   Date 2021 PCP SRIKANTH Rosales     PREOPERATIVE DIAGNOSIS/INDICATION: with cold forceps, rectosigmoid colon mucosal biopsies were performed with cold forceps.  The rectosigmoid mucosa appear edematous and pale         -    EUS: No perirectal lymphadenopathy identified,     RECOMMENDATIONS:   - Post Colonoscopy precautions, wa

## 2021-12-28 NOTE — ANESTHESIA POSTPROCEDURE EVALUATION
[address] Patient Status:  Hospital Outpatient Surgery   Age/Gender 73year old female MRN [id number]   Location [address] [address] Attending Will Velasquez MD   [facility] Day # 0 PCP SRIKANTH [place]       An

## 2021-12-28 NOTE — ANESTHESIA PREPROCEDURE EVALUATION
PRE-OP EVALUATION    Patient Name: Frandy Sweeney    Admit Diagnosis: Abnormal finding on imaging [R93.89]  Diarrhea in adult patient [R19.7]    Pre-op Diagnosis: Abnormal finding on imaging [R93.89]  Diarrhea in adult patient [R19.7]    RECTAL ENDOSCO complications         GI/Hepatic/Renal      (+) GERD                           Cardiovascular  Comment: Recent chest pains- normal stress echo, evaluated by cards  Negative cardiovascular ROS. ECG reviewed.   Exercise tolerance: good     MET: >4 Cardiovascular    Cardiovascular exam normal.  Rhythm: regular  Rate: normal     Dental  Comment: No gross abnormalities noted on exam. Patient denies any loose/missing/cracked teeth.             Pulmonary    Pulmonary exam normal.                 Other fin

## 2021-12-28 NOTE — H&P
[address] 56 Patient Status:  Hospital Outpatient Surgery    1955 MRN [id number]   Location [address] [place] [address] Attending Lenn Duverney, MD   Date 2021 PCP SRIKANTH Garber     CC Relation Age of Onset   • Hypertension Maternal Grandmother    • Stroke Maternal Grandmother    • Heart Disease Mother         hardening of the arteries   • Cancer Mother         breast/bone   • Breast Cancer Mother 74        78   • Other (MS) Sister Rt facial numbness     Seborrheic keratosis     Other acne     Postoperative wound infection    Diarrhea, abnormal CT    Plan;  Colonoscopy EUS    Pastor Sathya MD  12/28/2021  1:13 PM

## 2022-01-10 NOTE — PROGRESS NOTES
Date: 1/10/2022    To: Krissy Malhotra  : 1955     I hope this letter finds you doing well. I am writing to inform you of the following:      The biopsies obtained at the time of your recent endoscopic procedure showed rectosigmoid colon Low grad

## 2022-01-14 RX ORDER — PANTOPRAZOLE SODIUM 40 MG/1
TABLET, DELAYED RELEASE ORAL
Qty: 90 TABLET | Refills: 1 | Status: SHIPPED | OUTPATIENT
Start: 2022-01-14

## 2022-01-14 NOTE — TELEPHONE ENCOUNTER
Name from pharmacy: PANTOPRAZOLE SOD DR 40 MG TAB          Will file in chart as: PANTOPRAZOLE 40 MG Oral Tab EC    Sig: TAKE 1 TABLET BY MOUTH EVERY DAY IN THE MORNING BEFORE BREAKFAST    Disp:  90 tablet    Refills:  1 (Pharmacy requested: Not specified)

## 2022-02-11 ENCOUNTER — TELEPHONE (OUTPATIENT)
Dept: HEMATOLOGY/ONCOLOGY | Facility: HOSPITAL | Age: 67
End: 2022-02-11

## 2022-02-11 ENCOUNTER — LAB ENCOUNTER (OUTPATIENT)
Dept: LAB | Facility: HOSPITAL | Age: 67
End: 2022-02-11
Attending: INTERNAL MEDICINE
Payer: COMMERCIAL

## 2022-02-11 DIAGNOSIS — C83.30 DLBCL (DIFFUSE LARGE B CELL LYMPHOMA) (HCC): Primary | ICD-10-CM

## 2022-02-11 NOTE — TELEPHONE ENCOUNTER
Spoke to patient. Will see her at noon on Thursday next week. Will discuss with our pathologist.  Ben Christianson by Dr Gentry Houston.      DILIA Flores

## 2022-02-17 ENCOUNTER — OFFICE VISIT (OUTPATIENT)
Dept: HEMATOLOGY/ONCOLOGY | Age: 67
End: 2022-02-17
Attending: INTERNAL MEDICINE
Payer: COMMERCIAL

## 2022-02-17 VITALS
SYSTOLIC BLOOD PRESSURE: 125 MMHG | RESPIRATION RATE: 18 BRPM | HEART RATE: 75 BPM | OXYGEN SATURATION: 98 % | TEMPERATURE: 98 F | HEIGHT: 64.57 IN | BODY MASS INDEX: 22.71 KG/M2 | WEIGHT: 134.69 LBS | DIASTOLIC BLOOD PRESSURE: 85 MMHG

## 2022-02-17 DIAGNOSIS — C88.0 WALDENSTROM'S DISEASE (HCC): Primary | ICD-10-CM

## 2022-02-17 PROCEDURE — 99205 OFFICE O/P NEW HI 60 MIN: CPT | Performed by: INTERNAL MEDICINE

## 2022-02-17 RX ORDER — LIFITEGRAST 50 MG/ML
SOLUTION/ DROPS OPHTHALMIC
COMMUNITY
Start: 2021-12-27

## 2022-02-17 RX ORDER — METHYLPREDNISOLONE 4 MG/1
TABLET ORAL
COMMUNITY
Start: 2021-12-09 | End: 2022-02-17

## 2022-02-17 RX ORDER — AZELASTINE HYDROCHLORIDE 137 UG/1
SPRAY, METERED NASAL
COMMUNITY
Start: 2022-02-09

## 2022-02-17 NOTE — PROGRESS NOTES
Patient is here for MD consult. Hx of Lymphoma and Waldenstrom's. Originally diagnosed in 2016 after presenting with sinus problems and having uvula removed. Patient had 5 cycles of Bendamustine and Rituxan at that time. Patient reports she had been having issues with her breathing and found to have enlarged lymph nodes and elevation in light chains and immunoglobulins last year. She restarted Ritiuxan at [facility] for 8 weeks in August. Patient has been following up with Dr Blanca Gutiérrez due to frequent episodes of diarrhea. Patient had a colonoscopy on 12/28. Colon mass found and was positive for Low grade Hodgkin B-cell Lymphoma. Patient states nasal passages are swollen and she has a difficult time breathing. Saw ENT and was put on steroids and nasal spray. Here for a second opinion.       Education Record    Learner:  Patient    Disease / Diagnosis:  Consult     Barriers / Limitations:  None   Comments:    Method:  Discussion   Comments:    General Topics:  Plan of care reviewed   Comments:    Outcome:  Shows understanding   Comments:

## 2022-02-18 NOTE — CONSULTS
[facility]    PATIENT'S NAME: Dayton Pool   CONSULTING PHYSICIAN: Lito Watts M.D. PATIENT ACCOUNT #: [de-identified] LOCATION: [address] [place] [address] RECORD #: [id number] YOB: 1955   CONSULTATION DATE: 02/17/2022       [facility] NEW PATIENT CONSULT    PRIMARY PROVIDER:  KULDIP Horan     REASON FOR CONSULTATION:  Opinion regarding management of Waldenstrom macroglobulinemia. HISTORY OF PRESENT ILLNESS:  The patient is a 79-year-old female. She was diagnosed with Waldenstrom's in 2016. She had symptoms of nasal congestion and throat congestion for quite some time. Ultimately, she had a uvular biopsy at [facility] that demonstrated infiltration with a small lymphocytic lymphoma. It showed a low-grade B-cell lymphoma with monoclonal B-cell population expressing CD19, CD20, dim CD38, dim FMC-7, dim CD10, and kappa light chains. It was negative for CD5. Ki-67 was only 5%. MYD88 mutation was positive. Tissue was negative for BCL2 and BCL6. FISH for 11;14 and MZL panel were negative as well. PET imaging in February of that year demonstrated diffuse adenopathy above and below the diaphragm with only mild increased metabolic activity. She had a monoclonal IgM kappa with an IgM level of 1430. She had normal IgG level. She had no cytopenia. She had no initial bone marrow biopsy. She was under the care of Dr. Reed Saini at [facility]. She underwent 4 cycles of bendamustine and Rituxan. She had improvement in her symptoms. She had resolution of the previously abnormal FDG uptake, but her inguinal lymph nodes and axillary nodes still had a little bit of uptake. Posttreatment IgM was 215. She ultimately went for another opinion at [facility]. She had a bone marrow biopsy there which showed no evidence of involvement by flow or by morphology. She was offered Rituxan maintenance but was observed off therapy. She had a slow rise in her IgM.   Over the course of the next several years, she ultimately transitioned her care to Dr. Jose Trotter at [facility]. Since late 2020, she has noted subcutaneous nodules on both arms. In March of 2020, she had an excisional biopsy of a right upper extremity nodule which showed a similar histology. She started having increased nasal congestion, a muffled voice, and difficulty swallowing, and she then was treated by Dr. Maria Alejandra Stuart with weekly Rituxan for 8 weeks starting in June 2021. She is currently off therapy. She has had multiple opinions including with Dr. Natan Nguyen at [facility], Dr. Nataliia Lin at [facility], and Dr. Robert Plaza prior to that. She has also been in touch with Dr. Marcelo Cornejo at the [facility]. She has had issues also with diarrhea that has been part of her problem. She has relatively uncontrolled bowel movements. She has been able to maintain her weight. She uses Imodium. She ended up having a colonoscopy done by Dr. Chris Denton on December 28, and the findings included a sessile polyp in the ascending colon that was biopsied. She had edematous and pale rectosigmoid mucosa, and this was biopsied randomly. The pathology of the rectosigmoid colonic wall showed low-grade B-cell lymphoma. This was also sent for MYD88 mutational testing and was positive, consistent with her previous histology. She continues to struggle the most with her diarrhea. She has noted a bit more congestion in the nose. She is not having any swallowing issues to speak of. Dr. Chris Denton suggested she come here for our opinion. She has never had splenomegaly. Her IgM had dropped in the fall, but it has been climbing more recently. She recently had blood work done again at [facility] with her most recent value around 700. In November it was reportedly 376. Her IgG is at the lower limit of normal at 652. Their lower limit is 610. She has been vaccinated for COVID. She does have detectable COVID antibodies.   Dr. Maria Alejandra tSuart did mention Evusheld to her, but she is not scheduled to receive it. She denies any sweats or night sweats. PAST MEDICAL HISTORY:  Remarkable for no hypertension. She has hypothyroidism. She has no issues with dyslipidemia, diabetes, liver disease, renal disease. She has had a tonsillectomy done since her diagnosis, and she did not have tonsillar involvement. She had a breast biopsy that was benign. She is  3, para 3, with vaginal deliveries. MEDICATIONS:  Her medications currently include acyclovir 400 mg daily, azelaic acid topically p.r.n., azelastine nasal spray 2 sprays b.i.d., budesonide nebulizer p.r.n., calcium carbonate daily, vitamin D 1000 units daily, levothyroxine 50 mcg daily, omega-3 fatty acids daily, pantoprazole 40 mg daily, turmeric 500 mg daily, vitamin B12 at 1000 mcg daily, and Xiidra ophthalmic solution p.r.n. ALLERGIES:  No known allergies. SOCIAL HISTORY:  [place] originally from [place]. She has been here for approximately 40 years. 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 Her  is in sales and still working in [employer]. She does not smoke. Her alcohol intake is social.  She is not exercising recently. FAMILY MEDICAL HISTORY:  Notable for her mother having  at 66. She had cancer of the breast that went to the bone. She also had heart disease. Father is 80 and alive and well. She has a sister with multiple sclerosis, and 2 brothers who are alive and well. She has 3 children ages 40, 36, and 39, and 7 grandchildren, and they are all in good health. REVIEW OF SYSTEMS:  Her vision is fair, though she has dry eye. She denies any cataract, glaucoma, or retinal issues. Her hearing is good. Her teeth and gums are in good repair. She has had reflux which is controlled with pantoprazole. She denies any asthma, cough, or shortness of breath. She had an upper endoscopy a long time ago.   She denies any history of peptic ulcer disease, gallbladder disease, or hepatitis. She denies any urinary symptoms, renal calculi, or urinary infections. She has no significant arthritic or musculoskeletal complaints. She denies any neurologic disorders. The remainder of a 10-point complete review of systems is unremarkable with pertinent positives and negatives in the HPI. PHYSICAL EXAMINATION:    GENERAL:  She is a well-developed, well-nourished female in no acute distress. VITAL SIGNS:  Her performance status is 0 to 1. Her weight is 134 pounds. Blood pressure is 125/85, pulse 75, respiratory rate is 20, temperature is 97.9. HEENT:  Unremarkable. She has pink conjunctivae, anicteric sclerae. Pharynx without lesions. LYMPHATICS:  She has no cervical, supraclavicular or axillary adenopathy. LUNGS:  Resonant to percussion and clear to auscultation, with no wheezing, rales or rhonchi. HEART:  Normal.   ABDOMEN:  No hepatosplenomegaly or tenderness. EXTREMITIES:  She has no clubbing, cyanosis or edema. She has scattered plaque-like lesions that are subcutaneous including on the arms. The overlying skin is slightly darker. They do retract into the epidermis slightly. IMPRESSION AND PLAN:  Waldenstrom's Macroglobulinemia. She has a more infiltrative tissue component to her disease than the typical Waldenstrom patient with infiltration of her skin, infiltration of her nasopharynx and throat, and infiltration of her bowel wall. She is symptomatic mostly from the bowel, slightly less from the nasopharyngeal area, and minimally from the skin. She received Rituxan for an 8-week course last summer. She has had a fairly short period of disease control with a significant rise in her IgM just within the last 2 determinations done at [facility] with the IgM going from 376 on November 8 to 717 on February 10. Her diarrhea is becoming more problematic. She has normal blood counts with the exception of persistent mild leukopenia. She has normal renal function and liver function. As stated, she still has detectable COVID antibodies after being fully vaccinated. She has not received a fourth dose. Certainly, Dr. Javier Lira has been a bit reluctant to treat her given the pandemic, and it is understandable though we now do have Evusheld available. This would certainly give her some protection even if she were to lose the ability to create her own antibodies as a result of treatment. We talked about different options that are available including the use of the first- or second-generation BTK inhibitor. Repeated use of Rituxan is reasonable but not likely to evoke a lasting response. I would certainly not plan to retreat her with bendamustine given concerns about long-term bone marrow effects from this. Given the availability of Evusheld and the fact that she is symptomatic, I have suggested she talk with Dr. Javier Lira about the potential for retreatment. She has not had a PET scan for some time, and certainly it can demonstrate all the other sites a little more effectively. Bowel wall infiltration may be difficult to discern on a PET scan given the fact that there is normal bowel uptake on most PET scans to begin with. She is planning on continuing her care with Dr. Javier Lira. She appreciated the discussion, and I am available to help on this end should Dr. Javier Lira need anything done closer to home. Dictated By Janeth Rucker M.D.  d: 02/17/2022 17:02:42  t: 02/17/2022 19:40:07  [facility] [id number]  CG/    cc: MORRIS Wagoner M.D. Dr. Jaye Hemp

## 2022-03-17 ENCOUNTER — APPOINTMENT (OUTPATIENT)
Dept: HEMATOLOGY/ONCOLOGY | Age: 67
End: 2022-03-17
Attending: INTERNAL MEDICINE
Payer: COMMERCIAL

## 2022-07-19 RX ORDER — PANTOPRAZOLE SODIUM 40 MG/1
TABLET, DELAYED RELEASE ORAL
Qty: 90 TABLET | Refills: 0 | Status: SHIPPED | OUTPATIENT
Start: 2022-07-19

## 2022-07-21 ENCOUNTER — TELEPHONE (OUTPATIENT)
Dept: INTERNAL MEDICINE CLINIC | Facility: CLINIC | Age: 67
End: 2022-07-21

## 2022-07-21 DIAGNOSIS — Z13.0 SCREENING FOR DISORDER OF BLOOD AND BLOOD-FORMING ORGANS: ICD-10-CM

## 2022-07-21 DIAGNOSIS — Z00.00 ROUTINE GENERAL MEDICAL EXAMINATION AT A HEALTH CARE FACILITY: Primary | ICD-10-CM

## 2022-07-21 DIAGNOSIS — Z13.220 SCREENING FOR LIPID DISORDERS: ICD-10-CM

## 2022-07-21 DIAGNOSIS — Z13.29 SCREENING FOR THYROID DISORDER: ICD-10-CM

## 2022-07-21 DIAGNOSIS — Z13.228 SCREENING FOR METABOLIC DISORDER: ICD-10-CM

## 2022-07-21 NOTE — TELEPHONE ENCOUNTER
Future Appointments   Date Time Provider Rosie Bryson   8/16/2022  3:40 PM Shayy Hernandez, APRN [facility]     Orders to quest- Pt informed that labs need to be completed no sooner than 2 weeks prior to the appt.  Pt aware to fast-no call back required

## 2022-07-21 NOTE — TELEPHONE ENCOUNTER
Future Appointments   Date Time Provider Rosie Bryson   8/16/2022  3:40 PM SRIKANTH Cardenas [facility]

## 2022-08-12 LAB
ABSOLUTE BASOPHILS: 11 CELLS/UL (ref 0–200)
ABSOLUTE EOSINOPHILS: 0 CELLS/UL (ref 15–500)
ABSOLUTE LYMPHOCYTES: 102 CELLS/UL (ref 850–3900)
ABSOLUTE MONOCYTES: 609 CELLS/UL (ref 200–950)
ABSOLUTE NEUTROPHILS: 2779 CELLS/UL (ref 1500–7800)
ALBUMIN/GLOBULIN RATIO: 2.4 (CALC) (ref 1–2.5)
ALBUMIN: 4.6 G/DL (ref 3.6–5.1)
ALKALINE PHOSPHATASE: 69 U/L (ref 37–153)
ALT: 21 U/L (ref 6–29)
AST: 19 U/L (ref 10–35)
BASOPHILS: 0.3 %
BILIRUBIN, TOTAL: 0.6 MG/DL (ref 0.2–1.2)
BUN: 12 MG/DL (ref 7–25)
CALCIUM: 9.4 MG/DL (ref 8.6–10.4)
CARBON DIOXIDE: 26 MMOL/L (ref 20–32)
CHLORIDE: 107 MMOL/L (ref 98–110)
CHOL/HDLC RATIO: 3 (CALC)
CHOLESTEROL, TOTAL: 233 MG/DL
CREATININE: 0.77 MG/DL (ref 0.5–1.05)
EGFR: 85 ML/MIN/1.73M2
EOSINOPHILS: 0 %
GLOBULIN: 1.9 G/DL (CALC) (ref 1.9–3.7)
GLUCOSE: 87 MG/DL (ref 65–99)
HDL CHOLESTEROL: 78 MG/DL
HEMATOCRIT: 33.5 % (ref 35–45)
HEMOGLOBIN: 11.4 G/DL (ref 11.7–15.5)
LDL-CHOLESTEROL: 140 MG/DL (CALC)
LYMPHOCYTES: 2.9 %
MCH: 33.4 PG (ref 27–33)
MCHC: 34 G/DL (ref 32–36)
MCV: 98.2 FL (ref 80–100)
MONOCYTES: 17.4 %
MPV: 10.1 FL (ref 7.5–12.5)
NEUTROPHILS: 79.4 %
NON-HDL CHOLESTEROL: 155 MG/DL (CALC)
PLATELET COUNT: 193 THOUSAND/UL (ref 140–400)
POTASSIUM: 4.4 MMOL/L (ref 3.5–5.3)
PROTEIN, TOTAL: 6.5 G/DL (ref 6.1–8.1)
RDW: 13.3 % (ref 11–15)
RED BLOOD CELL COUNT: 3.41 MILLION/UL (ref 3.8–5.1)
SODIUM: 141 MMOL/L (ref 135–146)
TRIGLYCERIDES: 60 MG/DL
TSH W/REFLEX TO FT4: 1.55 MIU/L (ref 0.4–4.5)
WHITE BLOOD CELL COUNT: 3.5 THOUSAND/UL (ref 3.8–10.8)

## 2022-08-15 DIAGNOSIS — D64.9 ANEMIA, UNSPECIFIED TYPE: Primary | ICD-10-CM

## 2022-08-16 ENCOUNTER — OFFICE VISIT (OUTPATIENT)
Dept: INTERNAL MEDICINE CLINIC | Facility: CLINIC | Age: 67
End: 2022-08-16
Payer: COMMERCIAL

## 2022-08-16 VITALS
DIASTOLIC BLOOD PRESSURE: 62 MMHG | BODY MASS INDEX: 23.1 KG/M2 | WEIGHT: 137 LBS | HEIGHT: 64.57 IN | TEMPERATURE: 97 F | SYSTOLIC BLOOD PRESSURE: 98 MMHG | HEART RATE: 92 BPM

## 2022-08-16 DIAGNOSIS — C88.0 WALDENSTROM MACROGLOBULINEMIA (HCC): ICD-10-CM

## 2022-08-16 DIAGNOSIS — Z00.00 ROUTINE GENERAL MEDICAL EXAMINATION AT A HEALTH CARE FACILITY: Primary | ICD-10-CM

## 2022-08-16 DIAGNOSIS — K21.9 GASTROESOPHAGEAL REFLUX DISEASE WITHOUT ESOPHAGITIS: ICD-10-CM

## 2022-08-16 DIAGNOSIS — C85.18 B-CELL LYMPHOMA OF LYMPH NODES OF MULTIPLE REGIONS, UNSPECIFIED B-CELL LYMPHOMA TYPE (HCC): ICD-10-CM

## 2022-08-16 PROBLEM — T81.49XA POSTOPERATIVE WOUND INFECTION: Status: RESOLVED | Noted: 2021-03-22 | Resolved: 2022-08-16

## 2022-08-16 PROBLEM — R09.A2 GLOBUS SENSATION: Status: RESOLVED | Noted: 2018-06-08 | Resolved: 2022-08-16

## 2022-08-16 PROBLEM — R09.89 GLOBUS SENSATION: Status: RESOLVED | Noted: 2018-06-08 | Resolved: 2022-08-16

## 2022-08-16 PROBLEM — R55 SYNCOPE AND COLLAPSE: Status: RESOLVED | Noted: 2017-12-02 | Resolved: 2022-08-16

## 2022-08-16 PROBLEM — R15.9 INCONTINENCE OF FECES: Status: RESOLVED | Noted: 2018-01-22 | Resolved: 2022-08-16

## 2022-08-16 PROBLEM — J35.8 ASYMMETRIC TONSILS: Status: RESOLVED | Noted: 2018-03-02 | Resolved: 2022-08-16

## 2022-08-16 PROBLEM — R09.81 NASAL CONGESTION: Status: RESOLVED | Noted: 2017-09-29 | Resolved: 2022-08-16

## 2022-08-16 PROBLEM — R20.0 RT FACIAL NUMBNESS: Status: RESOLVED | Noted: 2017-09-29 | Resolved: 2022-08-16

## 2022-08-16 PROBLEM — S01.01XA SCALP LACERATION, INITIAL ENCOUNTER: Status: RESOLVED | Noted: 2017-12-02 | Resolved: 2022-08-16

## 2022-08-16 PROBLEM — J32.9 RECURRENT SINUSITIS: Status: RESOLVED | Noted: 2017-09-24 | Resolved: 2022-08-16

## 2022-08-16 PROCEDURE — 99397 PER PM REEVAL EST PAT 65+ YR: CPT | Performed by: NURSE PRACTITIONER

## 2022-08-16 PROCEDURE — 3078F DIAST BP <80 MM HG: CPT | Performed by: NURSE PRACTITIONER

## 2022-08-16 PROCEDURE — 3008F BODY MASS INDEX DOCD: CPT | Performed by: NURSE PRACTITIONER

## 2022-08-16 PROCEDURE — 3074F SYST BP LT 130 MM HG: CPT | Performed by: NURSE PRACTITIONER

## 2022-08-16 RX ORDER — CYCLOSPORINE 0.5 MG/ML
1 EMULSION OPHTHALMIC 2 TIMES DAILY
COMMUNITY
Start: 2022-07-11

## 2022-08-16 RX ORDER — ERYTHROMYCIN 5 MG/G
OINTMENT OPHTHALMIC
COMMUNITY
Start: 2022-08-06

## 2022-08-16 RX ORDER — SULFAMETHOXAZOLE AND TRIMETHOPRIM 800; 160 MG/1; MG/1
1 TABLET ORAL
COMMUNITY
Start: 2022-05-23

## 2022-08-16 RX ORDER — POTASSIUM CHLORIDE 1500 MG/1
2 TABLET, FILM COATED, EXTENDED RELEASE ORAL DAILY
COMMUNITY
Start: 2022-06-19

## 2022-09-29 ENCOUNTER — OFFICE VISIT (OUTPATIENT)
Dept: DERMATOLOGY | Age: 67
End: 2022-09-29

## 2022-09-29 DIAGNOSIS — L82.0 INFLAMED SEBORRHEIC KERATOSIS: ICD-10-CM

## 2022-09-29 DIAGNOSIS — L71.9 ROSACEA: ICD-10-CM

## 2022-09-29 DIAGNOSIS — Z85.828 HISTORY OF BASAL CELL CARCINOMA (BCC): ICD-10-CM

## 2022-09-29 DIAGNOSIS — L57.0 ACTINIC KERATOSIS: Primary | ICD-10-CM

## 2022-09-29 PROCEDURE — 17000 DESTRUCT PREMALG LESION: CPT | Performed by: NURSE PRACTITIONER

## 2022-09-29 PROCEDURE — 99213 OFFICE O/P EST LOW 20 MIN: CPT | Performed by: NURSE PRACTITIONER

## 2022-09-29 PROCEDURE — 17110 DESTRUCTION B9 LES UP TO 14: CPT | Performed by: NURSE PRACTITIONER

## 2022-10-18 RX ORDER — PANTOPRAZOLE SODIUM 40 MG/1
40 TABLET, DELAYED RELEASE ORAL
Qty: 90 TABLET | Refills: 2 | Status: SHIPPED | OUTPATIENT
Start: 2022-10-18

## 2022-11-18 NOTE — ED NOTES
Attempted to remove purple stone ring from RH4D with lubricant and string without success. Ring removed using ring cutter. Ring handed to pts spouse who put it in his pocket. Left message with my contact information for patient to return my call

## 2022-12-09 ENCOUNTER — OFFICE VISIT (OUTPATIENT)
Dept: DERMATOLOGY | Age: 67
End: 2022-12-09

## 2022-12-09 DIAGNOSIS — L82.0 INFLAMED SEBORRHEIC KERATOSIS: ICD-10-CM

## 2022-12-09 DIAGNOSIS — Z85.828 HISTORY OF BASAL CELL CARCINOMA (BCC): ICD-10-CM

## 2022-12-09 DIAGNOSIS — D48.5 NEOPLASM OF UNCERTAIN BEHAVIOR OF SKIN: Primary | ICD-10-CM

## 2022-12-09 DIAGNOSIS — L57.0 ACTINIC KERATOSIS: ICD-10-CM

## 2022-12-09 PROCEDURE — 17110 DESTRUCTION B9 LES UP TO 14: CPT | Performed by: NURSE PRACTITIONER

## 2022-12-09 PROCEDURE — 17003 DESTRUCT PREMALG LES 2-14: CPT | Performed by: NURSE PRACTITIONER

## 2022-12-09 PROCEDURE — 17000 DESTRUCT PREMALG LESION: CPT | Performed by: NURSE PRACTITIONER

## 2022-12-09 PROCEDURE — 11301 SHAVE SKIN LESION 0.6-1.0 CM: CPT | Performed by: NURSE PRACTITIONER

## 2022-12-09 PROCEDURE — 99213 OFFICE O/P EST LOW 20 MIN: CPT | Performed by: NURSE PRACTITIONER

## 2022-12-13 LAB — PATH REPORT PLASRBC-IMP: NORMAL

## 2022-12-19 ENCOUNTER — MED REC SCAN ONLY (OUTPATIENT)
Dept: INTERNAL MEDICINE CLINIC | Facility: CLINIC | Age: 67
End: 2022-12-19

## 2023-02-10 ENCOUNTER — OFFICE VISIT (OUTPATIENT)
Dept: DERMATOLOGY | Age: 68
End: 2023-02-10

## 2023-02-10 DIAGNOSIS — C44.519 BCC (BASAL CELL CARCINOMA), TRUNK: Primary | ICD-10-CM

## 2023-02-10 PROCEDURE — 17262 DSTRJ MAL LES T/A/L 1.1-2.0: CPT | Performed by: NURSE PRACTITIONER

## 2023-06-09 ENCOUNTER — OFFICE VISIT (OUTPATIENT)
Dept: DERMATOLOGY | Age: 68
End: 2023-06-09

## 2023-06-09 DIAGNOSIS — D18.01 CHERRY ANGIOMA: ICD-10-CM

## 2023-06-09 DIAGNOSIS — L81.4 LENTIGINES: ICD-10-CM

## 2023-06-09 DIAGNOSIS — L57.0 ACTINIC KERATOSIS: ICD-10-CM

## 2023-06-09 DIAGNOSIS — Z12.83 SCREENING EXAM FOR SKIN CANCER: ICD-10-CM

## 2023-06-09 DIAGNOSIS — D48.5 NEOPLASM OF UNCERTAIN BEHAVIOR OF SKIN: Primary | ICD-10-CM

## 2023-06-09 DIAGNOSIS — L82.1 SK (SEBORRHEIC KERATOSIS): ICD-10-CM

## 2023-06-09 DIAGNOSIS — Z85.828 HISTORY OF BASAL CELL CARCINOMA (BCC): ICD-10-CM

## 2023-06-09 PROCEDURE — 17003 DESTRUCT PREMALG LES 2-14: CPT | Performed by: NURSE PRACTITIONER

## 2023-06-09 PROCEDURE — 17000 DESTRUCT PREMALG LESION: CPT | Performed by: NURSE PRACTITIONER

## 2023-06-09 PROCEDURE — 11306 SHAVE SKIN LESION 0.6-1.0 CM: CPT | Performed by: NURSE PRACTITIONER

## 2023-06-09 PROCEDURE — 99213 OFFICE O/P EST LOW 20 MIN: CPT | Performed by: NURSE PRACTITIONER

## 2023-06-13 LAB — PATH REPORT PLASRBC-IMP: NORMAL

## 2023-12-07 ENCOUNTER — OFFICE VISIT (OUTPATIENT)
Dept: DERMATOLOGY | Age: 68
End: 2023-12-07

## 2023-12-07 DIAGNOSIS — L81.4 LENTIGINES: ICD-10-CM

## 2023-12-07 DIAGNOSIS — D18.01 CHERRY ANGIOMA: ICD-10-CM

## 2023-12-07 DIAGNOSIS — Z12.83 SCREENING EXAM FOR SKIN CANCER: ICD-10-CM

## 2023-12-07 DIAGNOSIS — L82.0 INFLAMED SEBORRHEIC KERATOSIS: Primary | ICD-10-CM

## 2023-12-07 DIAGNOSIS — Z85.828 HISTORY OF BASAL CELL CARCINOMA (BCC): ICD-10-CM

## 2023-12-07 PROCEDURE — 17110 DESTRUCTION B9 LES UP TO 14: CPT | Performed by: NURSE PRACTITIONER

## 2023-12-07 PROCEDURE — 99213 OFFICE O/P EST LOW 20 MIN: CPT | Performed by: NURSE PRACTITIONER

## 2023-12-15 DIAGNOSIS — K21.9 GASTROESOPHAGEAL REFLUX DISEASE WITHOUT ESOPHAGITIS: Primary | ICD-10-CM

## 2023-12-15 PROBLEM — L81.4 LENTIGINES: Status: ACTIVE | Noted: 2023-06-09

## 2023-12-15 PROBLEM — C85.80 MARGINAL ZONE LYMPHOMA (HCC): Status: ACTIVE | Noted: 2022-10-05

## 2023-12-15 PROBLEM — L82.0 INFLAMED SEBORRHEIC KERATOSIS: Status: ACTIVE | Noted: 2022-09-29

## 2023-12-15 PROBLEM — T45.1X5A CHEMOTHERAPY-INDUCED NAUSEA AND VOMITING: Status: ACTIVE | Noted: 2022-06-09

## 2023-12-15 PROBLEM — D48.5 NEOPLASM OF UNCERTAIN BEHAVIOR OF SKIN: Status: ACTIVE | Noted: 2022-12-09

## 2023-12-15 PROBLEM — E78.2 HYPERLIPIDEMIA, MIXED: Status: ACTIVE | Noted: 2021-12-21

## 2023-12-15 PROBLEM — C85.90 NON-HODGKIN LYMPHOMA (HCC): Status: ACTIVE | Noted: 2021-11-23

## 2023-12-15 PROBLEM — R07.9 ACUTE CHEST PAIN: Status: ACTIVE | Noted: 2021-11-23

## 2023-12-15 PROBLEM — D84.9 IMMUNOCOMPROMISED PATIENT (HCC): Status: ACTIVE | Noted: 2022-05-11

## 2023-12-15 PROBLEM — L82.1 SK (SEBORRHEIC KERATOSIS): Status: ACTIVE | Noted: 2023-06-09

## 2023-12-15 PROBLEM — L57.0 ACTINIC KERATOSIS: Status: ACTIVE | Noted: 2022-09-29

## 2023-12-15 PROBLEM — D18.01 CHERRY ANGIOMA: Status: ACTIVE | Noted: 2023-06-09

## 2023-12-15 PROBLEM — R11.2 CHEMOTHERAPY-INDUCED NAUSEA AND VOMITING: Status: ACTIVE | Noted: 2022-06-09

## 2023-12-15 PROBLEM — C83.00 MALIGNANT LYMPHOPLASMACYTIC LYMPHOMA (HCC): Status: ACTIVE | Noted: 2022-10-05

## 2023-12-15 PROBLEM — D47.2: Status: ACTIVE | Noted: 2017-09-24

## 2023-12-17 RX ORDER — PANTOPRAZOLE SODIUM 40 MG/1
40 TABLET, DELAYED RELEASE ORAL
Qty: 90 TABLET | Refills: 0 | Status: SHIPPED | OUTPATIENT
Start: 2023-12-17

## 2023-12-26 ENCOUNTER — HOSPITAL ENCOUNTER (OUTPATIENT)
Facility: HOSPITAL | Age: 68
Setting detail: HOSPITAL OUTPATIENT SURGERY
Discharge: HOME | End: 2023-12-26
Attending: INTERNAL MEDICINE | Admitting: INTERNAL MEDICINE
Payer: MEDICARE

## 2023-12-26 ENCOUNTER — ANESTHESIA (OUTPATIENT)
Dept: ENDOSCOPY | Facility: HOSPITAL | Age: 68
End: 2023-12-26
Payer: MEDICARE

## 2023-12-26 ENCOUNTER — ANESTHESIA EVENT (OUTPATIENT)
Dept: ENDOSCOPY | Facility: HOSPITAL | Age: 68
End: 2023-12-26
Payer: MEDICARE

## 2023-12-26 VITALS
HEIGHT: 64.5 IN | HEART RATE: 73 BPM | BODY MASS INDEX: 24.12 KG/M2 | WEIGHT: 143 LBS | TEMPERATURE: 97 F | OXYGEN SATURATION: 100 % | RESPIRATION RATE: 20 BRPM | SYSTOLIC BLOOD PRESSURE: 116 MMHG | DIASTOLIC BLOOD PRESSURE: 82 MMHG

## 2023-12-26 DIAGNOSIS — R19.5 MUCUS IN STOOL: ICD-10-CM

## 2023-12-26 DIAGNOSIS — C88.0 WALDENSTROM MACROGLOBULINEMIA (HCC): ICD-10-CM

## 2023-12-26 DIAGNOSIS — K21.9 GASTROESOPHAGEAL REFLUX DISEASE, UNSPECIFIED WHETHER ESOPHAGITIS PRESENT: ICD-10-CM

## 2023-12-26 DIAGNOSIS — R19.7 INTERMITTENT DIARRHEA: ICD-10-CM

## 2023-12-26 PROCEDURE — 0DBE8ZX EXCISION OF LARGE INTESTINE, VIA NATURAL OR ARTIFICIAL OPENING ENDOSCOPIC, DIAGNOSTIC: ICD-10-PCS | Performed by: INTERNAL MEDICINE

## 2023-12-26 PROCEDURE — 0DJD8ZZ INSPECTION OF LOWER INTESTINAL TRACT, VIA NATURAL OR ARTIFICIAL OPENING ENDOSCOPIC: ICD-10-PCS | Performed by: INTERNAL MEDICINE

## 2023-12-26 PROCEDURE — 0DBN8ZX EXCISION OF SIGMOID COLON, VIA NATURAL OR ARTIFICIAL OPENING ENDOSCOPIC, DIAGNOSTIC: ICD-10-PCS | Performed by: INTERNAL MEDICINE

## 2023-12-26 PROCEDURE — 88305 TISSUE EXAM BY PATHOLOGIST: CPT | Performed by: INTERNAL MEDICINE

## 2023-12-26 RX ORDER — NALOXONE HYDROCHLORIDE 0.4 MG/ML
0.08 INJECTION, SOLUTION INTRAMUSCULAR; INTRAVENOUS; SUBCUTANEOUS ONCE AS NEEDED
Status: DISCONTINUED | OUTPATIENT
Start: 2023-12-26 | End: 2023-12-26

## 2023-12-26 RX ORDER — SODIUM CHLORIDE, SODIUM LACTATE, POTASSIUM CHLORIDE, CALCIUM CHLORIDE 600; 310; 30; 20 MG/100ML; MG/100ML; MG/100ML; MG/100ML
INJECTION, SOLUTION INTRAVENOUS CONTINUOUS
Status: DISCONTINUED | OUTPATIENT
Start: 2023-12-26 | End: 2023-12-26

## 2023-12-26 RX ORDER — ONDANSETRON 2 MG/ML
4 INJECTION INTRAMUSCULAR; INTRAVENOUS ONCE AS NEEDED
Status: DISCONTINUED | OUTPATIENT
Start: 2023-12-26 | End: 2023-12-26

## 2023-12-26 RX ADMIN — SODIUM CHLORIDE, SODIUM LACTATE, POTASSIUM CHLORIDE, CALCIUM CHLORIDE: 600; 310; 30; 20 INJECTION, SOLUTION INTRAVENOUS at 12:32:00

## 2023-12-26 NOTE — ANESTHESIA POSTPROCEDURE EVALUATION
[facility] Patient Status:  Hospital Outpatient Surgery   Age/Gender 80year old female MRN [id number]   Location [address] [address] Attending Gaston Andrea MD   Hosp Day # 0 PCP Mavis Cedillo MD       Anesthesia Post-op Note    RECTAL ENDOSCOPIC ULTRASOUND (EUS), COLONOSCOPY with biopsies    Procedure Summary       Date: 12/26/23 Room / Location: [facility] ENDOSCOPY 02 / 140[facility] ENDOSCOPY    Anesthesia Start: 1226 Anesthesia Stop: 6427    Procedures:       RECTAL ENDOSCOPIC ULTRASOUND (EUS), COLONOSCOPY with biopsies      COLONOSCOPY Diagnosis:       Intermittent diarrhea      Mucus in stool      Waldenstrom macroglobulinemia ([facility]      Gastroesophageal reflux disease, unspecified whether esophagitis present      (Normal)    Surgeons: Gaston Andrea MD Anesthesiologist: Yeni Villa MD    Anesthesia Type: MAC ASA Status: 2            Anesthesia Type: MAC    Vitals Value Taken Time   BP 91/55 12/26/23 1232   Temp n 12/26/23 1236   Pulse 63 12/26/23 1236   Resp 14 12/26/23 1236   SpO2 100 % 12/26/23 1236   Vitals shown include unfiled device data. Patient Location: Endoscopy    Anesthesia Type: MAC    Airway Patency: patent    Postop Pain Control: adequate    Mental Status: mildly sedated but able to meaningfully participate in the post-anesthesia evaluation    Nausea/Vomiting: none    Cardiopulmonary/Hydration status: stable euvolemic    Complications: no apparent anesthesia related complications    Postop vital signs: stable    Dental Exam: Unchanged from Preop    Patient to be discharged home when criteria met.

## 2023-12-26 NOTE — H&P
[facility] Patient Status:  Hospital Outpatient Surgery    1955 MRN [id number]   Location [id number] [place] [address] Attending Hollis Donovan MD   Date 2023 PCP Belem Owens MD     CC: H/o adenomas and rectosigmoid Low grade non-Hodgkin B cell lymphoma     History of Present Illness: Juliette Us is a a(n) 80year old female. H/o adenomas and rectosigmoid Low grade non-Hodgkin B cell lymphoma     History:  Past Medical History:   Diagnosis Date    Abnormal Pap smear     Anemia     Basal cell cancer     Belching     Black stools     Blood in the stool     Body piercing Ears    Chemotherapy-induced nausea and vomiting 2022    Diarrhea, unspecified     Disorder of thyroid     Enlarged lymph node     Esophageal reflux     Flatulence/gas pain/belching     Frequent urination     Glaucoma     Heartburn 3-4 weeks    Hemorrhoids     High cholesterol     Hoarseness, chronic     HYPOTHYROIDISM     Indigestion     Irregular bowel habits     Leaking of urine     Night sweats     Non Hodgkin's lymphoma ([address] 2016    waldenstonstroms macro globulen anemia    Pain with bowel movements     Personal history of antineoplastic chemotherapy     ENDED SPRING 2022.  CURRENTLY ON RITUXAN TREATMENTS    PONV (postoperative nausea and vomiting)     Sleep disturbance     Stool incontinence     Syncope 2017    Thyroid disease     Usual hyperplasia of lactiferous duct     Visual impairment     glasses    Wears glasses     Weight gain      Past Surgical History:   Procedure Laterality Date    COLONOSCOPY      COLONOSCOPY  2018    repeat 10years Dr. Taylor Baltazar N/A 2021    Procedure: COLONOSCOPY;  Surgeon: Hollis Donovan MD;  Location: [address] [place] [address] ENDOSCOPY    COLPOSCOPY, CERVIX W/UPPER ADJACENT VAGINA; W/ENDOCERVICAL CURETTAGE      EXCISIONAL BIOPSY LEFT  2019    radial scar and usual ductal hyperplasia    OTHER SURGICAL HISTORY  01/2010     Nasal surgery    OTHER SURGICAL HISTORY      turbinectomy    OTHER SURGICAL HISTORY  02/2016    uvula removed    OTHER SURGICAL HISTORY  12/05/2019    Wire loc and exc left breast mass    OTHER SURGICAL HISTORY  03/05/2021    EXCISION OF CYST ON RIGHT ARM - DJP     TONSILLECTOMY      UPPER GI ENDOSCOPY,EXAM       Family History   Problem Relation Age of Onset    Hypertension Maternal Grandmother     Stroke Maternal Grandmother     Heart Disease Mother         hardening of the arteries    Cancer Mother         breast/bone    Breast Cancer Mother 74        78    Other (MS) Sister              Other (lupus) Maternal Aunt                reports that she has never smoked. She has never used smokeless tobacco. She reports current alcohol use of about 2.0 standard drinks of alcohol per week. She reports that she does not use drugs. Allergies:  No Known Allergies    Medications:    Current Facility-Administered Medications:     lactated ringers infusion, , Intravenous, Continuous    Physical Exam:    Height 5' 4.5\" (1.638 m), weight 143 lb (64.9 kg), not currently breastfeeding. General: Appears alert, oriented x3 and in no acute distress. CV: Normal rate   Lungs: Normal effort   Skin: Warm and dry.   Laboratory Data:       Imaging:      Assessment/Plan/Procedure:  Patient Active Problem List   Diagnosis    Other B-complex deficiencies    Waldenstrom macroglobulinemia ([address]    Non Hodgkin's lymphoma ([address]    Change in bowel habits    History of basal cell carcinoma (BCC)    Chronic lymphocytic thyroiditis    Esophageal reflux    H/O nasal septoplasty    Hypertrophy of nasal turbinates    Intermittent diarrhea    Polyarthritis    Rosacea    Seborrheic keratosis    Other acne    Acute chest pain    Chemotherapy-induced nausea and vomiting    Cherry angioma    Hyperlipidemia, mixed    Immunocompromised patient ([address]    Lentigines    Neoplasm of uncertain behavior of skin    Non-Hodgkin lymphoma ([address] Actinic keratosis    Inflamed seborrheic keratosis    SK (seborrheic keratosis)    Malignant lymphoplasmacytic lymphoma (HCC)    Marginal zone lymphoma (HCC)    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

## 2023-12-26 NOTE — OPERATIVE REPORT
[facility] Patient Status:  Hospital Outpatient Surgery    1955 MRN [id number]   Location [id number] [place] [address] Attending Barbara Santillan MD   Date 2023 PCP Kari Israel MD     PREOPERATIVE DIAGNOSIS/INDICATION: Change in bowel habits, H/o adenomas and rectosigmoid Low grade non-Hodgkin B cell lymphoma    POSTOPERTATIVE DIAGNOSIS: Normal  PROCEDURE PERFORMED: COLONOSCOPY with biopsy/EUS  SEDATION: MAC sedation provided by General Anesthesia    TIME OUT WAS PERFORMED    INFORMED CONSENT: Risks, benefits and alternatives to the procedure were explained to the patient including but not limited to bleeding, infection, perforation, adverse drug reactions, pancreatitis and the need for hospitalization and surgery if this occurs, the patient understands and agrees to procedure. PROCEDURE DESCRIPTION: After careful digital rectal examination a pediatric colonoscope was introduced into the patients rectum, advanced pass the recto sigmoid junction, into the descending colon, splenic flexure, transverse colon, hepatic flexure, ascending colon, cecum and the last 5-10cm of the terminal ileum, confirmed by landmarks, including the appendiceal orifice and ileocecal valve. Careful examination of the above described areas was performed on withdrawal of the endoscope. Retroflexion was performed on the rectum, after this a radial echoendoscope was advance to the sigmoid colon. The patient tolerated the procedure well, there were no immediate complication immediately following the procedure, and the patient was transferred to recovery in stable condition.   QUALITY OF PREPARATION: [place] Bowel Preparation Scale:            -      Right colon 3, Transverse colon 3, Left colon 3   FINDINGS/THERAPEUTICS:  TERMINAL ILEUM: Normal  COLON: Normal, random mucosal biopsies were performed from random colon mucosa and sigmoid colon  EUS: Normal, no lymphadenopathy or infiltrative process identified  RECOMMENDATIONS:   Post Colonoscopy precautions, watch for bleeding, infection, perforation, adverse drug reactions   Follow biopsies. Repeat colonoscopy in 10 years.     Latha France MD  12/26/2023  12:31 PM

## 2023-12-26 NOTE — DISCHARGE INSTRUCTIONS
Home Care Instructions for Rectal endoscopic ultrasound and Colonoscopy with Sedation    Diet:  - Resume your regular diet   - Start with light meals to minimize bloating.  - Do not drink alcohol today. Medication:  - If you have questions about resuming your normal medications, please contact your Primary Care Physician. Activities:  - Take it easy today. Do not return to work today. - Do not drive today. - Do not operate any machinery today (including kitchen equipment). Colonoscopy:  - You may notice some rectal \"spotting\" (a little blood on the toilet tissue) for a day or two after the exam. This is normal.  - If you experience any rectal bleeding (not spotting), persistent tenderness or sharp severe abdominal pains, oral temperature over 100 degrees Fahrenheit, light-headedness or dizziness, or any other problems, contact your doctor. **If unable to reach your doctor, please go to the [facility] Emergency Room**    - Your referring physician will receive a full report of your examination.  - If you do not hear from your doctor's office within two weeks of your biopsy, please call them for your results.

## 2024-03-13 DIAGNOSIS — K21.9 GASTROESOPHAGEAL REFLUX DISEASE WITHOUT ESOPHAGITIS: ICD-10-CM

## 2024-03-13 RX ORDER — PANTOPRAZOLE SODIUM 40 MG/1
40 TABLET, DELAYED RELEASE ORAL
Qty: 90 TABLET | Refills: 0 | Status: SHIPPED | OUTPATIENT
Start: 2024-03-13

## 2024-03-13 NOTE — TELEPHONE ENCOUNTER
Patient needs ov.  Last 8/22  I know she has a lot going on but need to see her for refills.   Thanks

## 2024-03-13 NOTE — TELEPHONE ENCOUNTER
Requested Prescriptions     Pending Prescriptions Disp Refills    PANTOPRAZOLE 40 MG Oral Tab EC [Pharmacy Med Name: PANTOPRAZOLE SOD DR 40 MG TAB] 90 tablet 0     Sig: TAKE 1 TABLET BY MOUTH EVERY DAY BEFORE BREAKFAST       LOV: 8--sd-physical    LAST CPE: 8--sd-physical    Last Labs: 2-3-2024-cmp,cbc    Last Refill: 12--90 tabs with 0 refills     Your appointments       Date & Time Appointment Department (Center)    Aug 14, 2024  9:40 AM CDT Follow-Up OV with Rosalia Hylton APRN [facility] ([facility])    Please arrive 15 minutes prior to your scheduled appointment time.               [facility]  [facility]  [facility] [address]  [phone]

## 2024-03-15 NOTE — TELEPHONE ENCOUNTER
Pt scheduled for AWV with SD.    Future Appointments   Date Time Provider Department Center   5/22/2024 10:00 AM Radha Yeboah APRN [facility]   8/14/2024  9:40 AM Rosalia Hylton APRN [facility]

## 2024-05-13 ENCOUNTER — TELEPHONE (OUTPATIENT)
Dept: INTERNAL MEDICINE CLINIC | Facility: CLINIC | Age: 69
End: 2024-05-13

## 2024-05-13 DIAGNOSIS — Z00.00 ROUTINE GENERAL MEDICAL EXAMINATION AT A HEALTH CARE FACILITY: Primary | ICD-10-CM

## 2024-05-13 DIAGNOSIS — E78.2 HYPERLIPIDEMIA, MIXED: ICD-10-CM

## 2024-05-14 ENCOUNTER — OFFICE VISIT (OUTPATIENT)
Dept: INTERNAL MEDICINE CLINIC | Facility: CLINIC | Age: 69
End: 2024-05-14

## 2024-05-14 VITALS
TEMPERATURE: 97 F | WEIGHT: 145.81 LBS | BODY MASS INDEX: 24.89 KG/M2 | OXYGEN SATURATION: 99 % | DIASTOLIC BLOOD PRESSURE: 72 MMHG | HEIGHT: 64 IN | HEART RATE: 77 BPM | RESPIRATION RATE: 18 BRPM | SYSTOLIC BLOOD PRESSURE: 118 MMHG

## 2024-05-14 DIAGNOSIS — E78.2 HYPERLIPIDEMIA, MIXED: ICD-10-CM

## 2024-05-14 DIAGNOSIS — E06.3 CHRONIC LYMPHOCYTIC THYROIDITIS: ICD-10-CM

## 2024-05-14 DIAGNOSIS — D47.2 MONOCLONAL GAMMOPATHY ASSOCIATED WITH LYMPHOPLASMACYTIC DYSCRASIAS: ICD-10-CM

## 2024-05-14 DIAGNOSIS — C85.80 MARGINAL ZONE LYMPHOMA (HCC): ICD-10-CM

## 2024-05-14 DIAGNOSIS — C83.00 MALIGNANT LYMPHOPLASMACYTIC LYMPHOMA (HCC): ICD-10-CM

## 2024-05-14 DIAGNOSIS — K21.9 GASTROESOPHAGEAL REFLUX DISEASE WITHOUT ESOPHAGITIS: ICD-10-CM

## 2024-05-14 DIAGNOSIS — M13.0 POLYARTHRITIS: ICD-10-CM

## 2024-05-14 DIAGNOSIS — Z00.00 ROUTINE GENERAL MEDICAL EXAMINATION AT A HEALTH CARE FACILITY: Primary | ICD-10-CM

## 2024-05-14 DIAGNOSIS — C85.18 B-CELL LYMPHOMA OF LYMPH NODES OF MULTIPLE REGIONS, UNSPECIFIED B-CELL LYMPHOMA TYPE (HCC): ICD-10-CM

## 2024-05-14 DIAGNOSIS — R19.4 CHANGE IN BOWEL HABITS: ICD-10-CM

## 2024-05-14 DIAGNOSIS — Z12.31 ENCOUNTER FOR SCREENING MAMMOGRAM FOR MALIGNANT NEOPLASM OF BREAST: ICD-10-CM

## 2024-05-14 DIAGNOSIS — D84.9 IMMUNOCOMPROMISED PATIENT (HCC): ICD-10-CM

## 2024-05-14 DIAGNOSIS — Z85.828 HISTORY OF BASAL CELL CARCINOMA (BCC): ICD-10-CM

## 2024-05-14 DIAGNOSIS — C88.0 WALDENSTROM MACROGLOBULINEMIA (HCC): ICD-10-CM

## 2024-05-14 PROBLEM — R07.9 ACUTE CHEST PAIN: Status: RESOLVED | Noted: 2021-11-23 | Resolved: 2024-05-14

## 2024-05-14 PROBLEM — C85.90 NON-HODGKIN LYMPHOMA (HCC): Status: RESOLVED | Noted: 2021-11-23 | Resolved: 2024-05-14

## 2024-05-14 PROBLEM — D48.5 NEOPLASM OF UNCERTAIN BEHAVIOR OF SKIN: Status: RESOLVED | Noted: 2022-12-09 | Resolved: 2024-05-14

## 2024-05-14 PROCEDURE — G0439 PPPS, SUBSEQ VISIT: HCPCS | Performed by: NURSE PRACTITIONER

## 2024-05-14 PROCEDURE — 99214 OFFICE O/P EST MOD 30 MIN: CPT | Performed by: NURSE PRACTITIONER

## 2024-05-14 RX ORDER — PANTOPRAZOLE SODIUM 40 MG/1
40 TABLET, DELAYED RELEASE ORAL
Qty: 90 TABLET | Refills: 3 | Status: SHIPPED | OUTPATIENT
Start: 2024-05-14

## 2024-05-14 NOTE — PROGRESS NOTES
Subjective:   Christina Allan is a 69 year old female who presents for a Medicare Subsequent Annual Wellness visit (Pt already had Initial Annual Wellness) and Here for AWV and follow up of chronic health issues.  .   last ov August 2022.    Marginal zone lymphoma  [facility]. Sophia Joe.  Continuing and completing target therapy.      Hyperlipidemia Dr Marinsecu zetia.    Uncertain last lipid but lstates she is up to date.  Her f/u with him is later 2024.      Diarrhea and GERD  PPI  [facility] Dr Altamirano.  Last in Feb.      Chronic lymphocytic thyroiditis  levothyroxine 50mcg.   Endo [facility]   Last TSH April 2023 in Care everywhere.     Dermatology  hx BCC Dr Rosales      Normal dexa 2022 at [facility].      Mild hyperglycemia  receives steroids with onc treatment.  Will follow  watch diet.      History/Other:   Fall Risk Assessment:   She has been screened for Falls and is low risk.      Cognitive Assessment:   She had a completely normal cognitive assessment - see flowsheet entries       Functional Ability/Status:   Christina Allan has a completely normal functional assessment. See flowsheet for details.      Depression Screening (PHQ-2/PHQ-9): PHQ-2 SCORE: 0  , done 5/14/2024   Last [place] Suicide Screening on 5/14/2024 was No Risk.         Advanced Directives:   She does NOT have a Living Will. [Do you have a living will?: No]  She does have a POA but we do NOT have it on file in [facility].    Patient has Advance Care Planning documents but we do not have a copy in EMR. Discussed Advanced Care Planning with patient and instructed patient to get our office a copy to be scanned into EMR.      Patient Active Problem List   Diagnosis    Waldenstrom macroglobulinemia (HCC)    Non Hodgkin's lymphoma (HCC)    Change in bowel habits    History of basal cell carcinoma (BCC)    Chronic lymphocytic thyroiditis    Esophageal reflux    H/O nasal septoplasty    Hypertrophy of nasal turbinates    Intermittent diarrhea     Polyarthritis    Rosacea    Seborrheic keratosis    Other acne    Chemotherapy-induced nausea and vomiting    Cherry angioma    Hyperlipidemia, mixed    Immunocompromised patient (HCC)    Lentigines    Actinic keratosis    Inflamed seborrheic keratosis    SK (seborrheic keratosis)    Malignant lymphoplasmacytic lymphoma (HCC)    Marginal zone lymphoma (HCC)    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    Current Medications:  Outpatient Medications Marked as Taking for the 5/14/24 encounter (Office Visit) with Radha Yeboah APRN   Medication Sig    pantoprazole 40 MG Oral Tab EC Take 1 tablet (40 mg total) by mouth before breakfast.    ezetimibe (ZETIA) 10 MG Oral Tab Zetia 10 mg tablet, [RxNorm: 564283]    Calcium Carb-Cholecalciferol 500-10 MG-MCG Oral Tab Calcium 500 + D 500 mg-10 mcg (400 unit) tablet, [RxNorm: 036433]    riTUXimab (RITUXAN IV) Inject into the vein. EVERY 2 MONTHS    RESTASIS 0.05 % Ophthalmic Emulsion Place 1 drop into both eyes 2 (two) times daily.    sulfamethoxazole-trimethoprim -160 MG Oral Tab per tablet Take 1 tablet by mouth 3 (three) times a week.    acyclovir 400 MG Oral Tab Take 1 tablet (400 mg total) by mouth daily.    Omega-3 Fatty Acids (FISH OIL OR) Take by mouth.    Levothyroxine Sodium (SYNTHROID) 50 MCG Oral Tab TAKE 1 TABLET BY MOUTH EVERY DAY    Calcium Carbonate (CALCIUM 500 OR) Take  by mouth.    Cholecalciferol (VITAMIN D) 1000 UNITS Oral Tab Take  by mouth.    Vitamin B-12 (VITAMIN B12) 1000 MCG Oral Tab Take 1 tablet (1,000 mcg total) by mouth daily.       Medical History:  She  has a past medical history of Abnormal Pap smear (2000), Anemia, Basal cell cancer, Belching, Black stools, Blood in the stool, Body piercing (Ears), Chemotherapy-induced nausea and vomiting (06/09/2022), Diarrhea, unspecified, Disorder of thyroid, Enlarged lymph node, Esophageal reflux, Flatulence/gas pain/belching,  Frequent urination, Glaucoma, Heartburn (3-4 weeks), Hemorrhoids, High cholesterol, Hoarseness, chronic, HYPOTHYROIDISM, Indigestion, Irregular bowel habits, Leaking of urine, Night sweats, Non Hodgkin's lymphoma (HCC) (02/03/2016), Pain with bowel movements, Personal history of antineoplastic chemotherapy, PONV (postoperative nausea and vomiting), Sleep disturbance, Stool incontinence, Syncope (12/01/2017), Thyroid disease, Usual hyperplasia of lactiferous duct (2019), Visual impairment, Wears glasses, and Weight gain.  Surgical History:  She  has a past surgical history that includes colposcopy, cervix w/upper adjacent vagina; w/endocervical curettage (2005); colonoscopy; upper gi endoscopy,exam; colonoscopy (03/08/2018); tonsillectomy; excisional biopsy left (12/2019); other surgical history (01/2010 ); other surgical history; other surgical history (02/2016); other surgical history (12/05/2019); other surgical history (03/05/2021); colonoscopy (N/A, 12/28/2021); and colonoscopy (N/A, 12/26/2023).   Family History:  Her family history includes Breast Cancer (age of onset: 78) in her mother; Cancer in her mother; Heart Disease in her mother; Hypertension in her maternal grandmother; MS in her sister; Stroke in her maternal grandmother; lupus in her maternal aunt.  Social History:  She  reports that she has never smoked. She has never used smokeless tobacco. She reports current alcohol use of about 2.0 standard drinks of alcohol per week. She reports that she does not use drugs.    Tobacco:  She has never smoked tobacco.    CAGE Alcohol Screen:   CAGE screening score of 0 on 5/13/2024, showing low risk of alcohol abuse.      Patient Care Team:  Colten Blackman DO as PCP - General (Internal Medicine)  Rashmi Good DO as Consulting Physician (NEUROLOGY)  Mynor Altamirano MD (GASTROENTEROLOGY)  Lamont Crystal MD (HEMATOLOGY)  Rosalia Hylton APRN (Nurse Practitioner)  Jorge Blackwell MD  (CARDIOLOGY)  Radha Yeboah APRN (Nurse Practitioner Acute Care)  Radha Yeboah APRN (Nurse Practitioner)    Review of Systems     Negative except as above     Objective:   Physical Exam  Physical Exam  General Appearance:  Alert, cooperative, no distress, appears stated age   Head:  Normocephalic, without obvious abnormality, atraumatic   Eyes:  PERRL, conjunctiva/corneas clear, EOM's intact both eyes   Ears:  Normal TM's and external ear canals, both ears   Nose: Nares normal, septum midline,mucosa normal, no drainage or sinus tenderness   Throat: Lips, mucosa, and tongue normal; teeth and gums normal   Neck: Supple, symmetrical, trachea midline, no adenopathy;  thyroid: not enlarged, symmetric, no JVD   Back:   Symmetric, no curvature, ROM normal, no CVA tenderness   Lungs:   Clear to auscultation bilaterally, respirations unlabored   Heart:  Regular rate and rhythm, S1 and S2 normal,    Abdomen:   Soft, non-tender, bowel sounds active all four quadrants,  no masses,    Extremities: Extremities normal, atraumatic, no edema   Pulses: symmetric   Skin: Skin color, texture, turgor normal,        Neurologic: Normal             /72   Pulse 77   Temp 96.5 °F (35.8 °C) (Temporal)   Resp 18   Ht 5' 4\" (1.626 m)   Wt 145 lb 12.8 oz (66.1 kg)   LMP  (LMP Unknown)   SpO2 99%   BMI 25.03 kg/m²  Estimated body mass index is 25.03 kg/m² as calculated from the following:    Height as of this encounter: 5' 4\" (1.626 m).    Weight as of this encounter: 145 lb 12.8 oz (66.1 kg).    Medicare Hearing Assessment:   Hearing Screening    Time taken: 5/14/2024 10:12 AM  Entry User: Rosa Pugh CMA  Screening Method: Finger Rub               Assessment & Plan:   Christina Allan is a 69 year old female who presents for a Medicare Assessment.     1. Routine general medical examination at a health care facility (Primary)  2. Gastroesophageal reflux disease without esophagitis pantoprazole  follows also with GI.    -      Pantoprazole Sodium; Take 1 tablet (40 mg total) by mouth before breakfast.  Dispense: 90 tablet; Refill: 3  3. Encounter for screening mammogram for malignant neoplasm of breast  -     [facility] LEAH 2D+3D SCREENING BILAT (CPT=77067/30901); Future; Expected date: 05/14/2024  4. Immunocompromised patient (HCC)  stalbe  she will check regarding PCV 20.    5. Malignant lymphoplasmacytic lymphoma (HCC)  stable  [facility]  continues with active treatment.   6. Marginal zone lymphoma (HCC)  stable  [facility]  continues with active treatment.   7. 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   8. Waldenstrom macroglobulinemia (HCC) stable  [facility]  continues with active treatment.    9. Hyperlipidemia, mixed  stable  per Dr Galileo Duarte.    10. Chronic lymphocytic thyroiditis  stable  per endo Synthroid.   11. Change in bowel habits  stable but still intermittently bohtersome.  Has f/u with GI.    12. Polyarthritis  stalb e montior.   13. History of basal cell carcinoma (BCC)  stalbe per derm.   14. 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     The patient indicates understanding of these issues and agrees to the plan.  Reinforced healthy diet, lifestyle, and exercise.      No follow-ups on file.     SRIKANTH Schulz, 5/14/2024     Supplementary Documentation:   General Health:  In the past six months, have you lost more than 10 pounds without trying?: 2 - No  Has your appetite been poor?: No  Type of Diet: Balanced  How does the patient maintain a good energy level?: Daily Walks  How would you describe your daily physical activity?: Moderate  How would you describe your current health state?: Good  How do you maintain positive mental well-being?: Social Interaction  On a scale of 0 to 10, with 0 being no pain and 10 being  severe pain, what is your pain level?: 0 - (None)  In the past six months, have you experienced urine leakage?: 0-No  At any time do you feel concerned for the safety/well-being of yourself and/or your children, in your home or elsewhere?: No  Have you had any immunizations at another office such as Influenza, Hepatitis B, Tetanus, or Pneumococcal?: Yes       Christina Debbie Jerrell's SCREENING SCHEDULE   Tests on this list are recommended by your physician but may not be covered, or covered at this frequency, by your insurer.   Please check with your insurance carrier before scheduling to verify coverage.   PREVENTATIVE SERVICES FREQUENCY &  COVERAGE DETAILS LAST COMPLETION DATE   Diabetes Screening    Fasting Blood Sugar /  Glucose    One screening every 12 months if never tested or if previously tested but not diagnosed with pre-diabetes   One screening every 6 months if diagnosed with pre-diabetes Lab Results   Component Value Date    GLU 87 08/11/2022        Cardiovascular Disease Screening    Lipid Panel  Cholesterol  Lipoprotein (HDL)  Triglycerides Covered every 5 years for all Medicare beneficiaries without apparent signs or symptoms of cardiovascular disease Lab Results   Component Value Date    CHOLEST 233 (H) 08/11/2022    HDL 78 08/11/2022     (H) 08/11/2022    TRIG 60 08/11/2022         Electrocardiogram (EKG)   Covered if needed at Welcome to Medicare, and non-screening if indicated for medical reasons 11/17/2021      Ultrasound Screening for Abdominal Aortic Aneurysm (AAA) Covered once in a lifetime for one of the following risk factors    Men who are 65-75 years old and have ever smoked    Anyone with a family history -     Colorectal Cancer Screening  Covered for ages 50-85; only need ONE of the following:    Colonoscopy   Covered every 10 years    Covered every 2 years if patient is at high risk or previous colonoscopy was abnormal 12/26/2023    Health Maintenance   Topic Date Due    Colorectal  Cancer Screening  12/26/2033       Flexible Sigmoidoscopy   Covered every 4 years -    Fecal Occult Blood Test Covered annually -   Bone Density Screening    Bone density screening    Covered every 2 years after age 65 if diagnosed with risk of osteoporosis or estrogen deficiency.    Covered yearly for long-term glucocorticoid medication use (Steroids) Last Dexa Scan:    DEXA BONE DENSITY, AXIAL (CPT=77080) 01/24/2011      Health Maintenance Due   Topic Date Due    DEXA Scan  01/08/2021      Pap and Pelvic    Pap   Covered every 2 years for women at normal risk; Annually if at high risk 04/21/2022  Health Maintenance   Topic Date Due    Pap Smear  10/08/2024       Chlamydia Annually if high risk -  No recommendations at this time   Screening Mammogram    Mammogram     Recommend annually for all female patients aged 40 and older    One baseline mammogram covered for patients aged 35-39 12/02/2022    Health Maintenance   Topic Date Due    Mammogram  09/13/2022       Immunizations    Influenza Covered once per flu season  Please get every year 09/01/2023  No recommendations at this time    Pneumococcal Each vaccine (Czbhywn81 & [id number]) covered once after 65 Prevnar 13: 10/26/2016    [id number]: 11/07/2017     Pneumococcal Vaccination(4 of 4 - PPSV23 or PCV20) due on 11/07/2022    Hepatitis B One screening covered for patients with certain risk factors   -  No recommendations at this time    Tetanus Toxoid Not covered by Medicare Part B unless medically necessary (cut with metal); may be covered with your pharmacy prescription benefits -    Tetanus, Diptheria and Pertusis TD and TDaP Not covered by Medicare Part B -  No recommendations at this time    Zoster Not covered by Medicare Part B; may be covered with your pharmacy  prescription benefits -  No recommendations at this time

## 2024-06-13 ENCOUNTER — APPOINTMENT (OUTPATIENT)
Dept: DERMATOLOGY | Age: 69
End: 2024-06-13

## 2024-06-16 DIAGNOSIS — K21.9 GASTROESOPHAGEAL REFLUX DISEASE WITHOUT ESOPHAGITIS: ICD-10-CM

## 2024-06-18 RX ORDER — PANTOPRAZOLE SODIUM 40 MG/1
40 TABLET, DELAYED RELEASE ORAL
Qty: 90 TABLET | Refills: 3 | Status: SHIPPED | OUTPATIENT
Start: 2024-06-18

## 2024-06-18 NOTE — TELEPHONE ENCOUNTER
Refill passed per [facility] protocols.    Requested Prescriptions   Pending Prescriptions Disp Refills    PANTOPRAZOLE 40 MG Oral Tab EC [Pharmacy Med Name: PANTOPRAZOLE SOD DR 40 MG TAB] 90 tablet 3     Sig: TAKE 1 TABLET BY MOUTH EVERY DAY BEFORE BREAKFAST       Gastrointestional Medication Protocol Passed - 6/16/2024  6:37 AM        Passed - In person appointment or virtual visit in the past 12 mos or appointment in next 3 mos     Recent Outpatient Visits              1 month ago Routine general medical examination at a health care facility    [address] Radha Yeboah APRN    Office Visit    4 months ago Atypical chest pain    [facility] Rosalia Hylton APRN    Office Visit    6 months ago Intermittent diarrhea    [facility] Rosalia Hylton APRN    Office Visit    1 year ago Routine general medical examination at a health care facility    [address] Radha Yeboah APRN    Office Visit    2 years ago Waldenstrom's disease ([facility])    [facility] [place] Son Flores MD    Office Visit          Future Appointments         Provider Department Appt Notes    In 1 month Rosalia Hylton APRN [facility] 02/16/2024 scheduled a 6 month FUOV with TIFFANY Lindsey on 08/14/2024 @9:40am Rae

## 2024-07-30 ENCOUNTER — APPOINTMENT (OUTPATIENT)
Dept: DERMATOLOGY | Age: 69
End: 2024-07-30

## 2024-07-30 DIAGNOSIS — D48.5 NEOPLASM OF UNCERTAIN BEHAVIOR OF SKIN: Primary | ICD-10-CM

## 2024-07-30 DIAGNOSIS — L82.0 INFLAMED SEBORRHEIC KERATOSIS: ICD-10-CM

## 2024-07-30 DIAGNOSIS — Z85.828 HISTORY OF BASAL CELL CARCINOMA (BCC): ICD-10-CM

## 2024-07-30 DIAGNOSIS — L81.4 LENTIGINES: ICD-10-CM

## 2024-07-30 DIAGNOSIS — D18.01 CHERRY ANGIOMA: ICD-10-CM

## 2024-08-02 LAB
ASR DISCLAIMER: NORMAL
CASE RPRT: NORMAL
CLINICAL INFO: NORMAL
PATH REPORT.FINAL DX SPEC: NORMAL
PATH REPORT.GROSS SPEC: NORMAL

## 2024-08-05 ENCOUNTER — TELEPHONE (OUTPATIENT)
Dept: DERMATOLOGY | Age: 69
End: 2024-08-05

## 2024-08-05 DIAGNOSIS — C44.519 BCC (BASAL CELL CARCINOMA), TRUNK: Primary | ICD-10-CM

## 2024-08-27 ENCOUNTER — APPOINTMENT (OUTPATIENT)
Dept: SURGERY | Age: 69
End: 2024-08-27
Attending: NURSE PRACTITIONER

## 2024-09-03 ENCOUNTER — APPOINTMENT (OUTPATIENT)
Dept: DERMATOLOGY | Age: 69
End: 2024-09-03

## 2024-09-03 ENCOUNTER — APPOINTMENT (OUTPATIENT)
Dept: SURGERY | Age: 69
End: 2024-09-03
Attending: NURSE PRACTITIONER

## 2024-09-03 VITALS — WEIGHT: 144.84 LBS | TEMPERATURE: 97.9 F | BODY MASS INDEX: 24.73 KG/M2 | HEIGHT: 64 IN

## 2024-09-03 DIAGNOSIS — C44.519 BASAL CELL CARCINOMA (BCC) OF SKIN OF OTHER PART OF TORSO: Primary | ICD-10-CM

## 2024-09-03 DIAGNOSIS — L82.0 INFLAMED SEBORRHEIC KERATOSIS: ICD-10-CM

## 2024-09-03 DIAGNOSIS — B07.8 OTHER VIRAL WARTS: ICD-10-CM

## 2024-09-10 ENCOUNTER — APPOINTMENT (OUTPATIENT)
Dept: SURGERY | Age: 69
End: 2024-09-10

## 2024-09-10 DIAGNOSIS — Z48.02 VISIT FOR SUTURE REMOVAL: Primary | ICD-10-CM

## 2024-09-10 PROCEDURE — 99024 POSTOP FOLLOW-UP VISIT: CPT | Performed by: SURGERY

## 2025-01-06 ENCOUNTER — APPOINTMENT (OUTPATIENT)
Dept: DERMATOLOGY | Age: 70
End: 2025-01-06

## 2025-01-06 DIAGNOSIS — Z12.83 SCREENING EXAM FOR SKIN CANCER: ICD-10-CM

## 2025-01-06 DIAGNOSIS — L57.0 ACTINIC KERATOSIS: ICD-10-CM

## 2025-01-06 DIAGNOSIS — L71.9 ROSACEA: ICD-10-CM

## 2025-01-06 DIAGNOSIS — Z85.828 HISTORY OF BASAL CELL CARCINOMA (BCC): ICD-10-CM

## 2025-01-06 DIAGNOSIS — L82.0 INFLAMED SEBORRHEIC KERATOSIS: ICD-10-CM

## 2025-01-06 DIAGNOSIS — D48.5 NEOPLASM OF UNCERTAIN BEHAVIOR OF SKIN: Primary | ICD-10-CM

## 2025-01-20 ENCOUNTER — MED REC SCAN ONLY (OUTPATIENT)
Dept: INTERNAL MEDICINE CLINIC | Facility: CLINIC | Age: 70
End: 2025-01-20

## 2025-01-20 ENCOUNTER — NURSE TRIAGE (OUTPATIENT)
Dept: INTERNAL MEDICINE CLINIC | Facility: CLINIC | Age: 70
End: 2025-01-20

## 2025-01-20 NOTE — TELEPHONE ENCOUNTER
Action Requested: Summary for Provider     []  Critical Lab, Recommendations Needed  [] Need Additional Advice  []   FYI    []   Need Orders  [] Need Medications Sent to Pharmacy  []  Other     SUMMARY: Pt agreeable to go to IC in [place] today for eval. Pt wants an appointment for when she is back from [place]. I advised pt can schedule, however needs to understand she should not wait for this apt for this pain and she needs to seek care today in [facility]. Pt verbalizes understanding and is agreeable to plan.     Reason for call: No chief complaint on file.  Onset: Saturday   Reason for Disposition   MILD TO MODERATE constant pain lasting > 2 hours    Protocols used: Abdominal Pain - Female-A-OH      C/o lower abdominal pain   Pain level varies, but \"discomfort\" is always there. Can go up to 9/10  Denies diarrhea, constipation, urinary symptoms   Hurts when getting up to stand. Feels ok when she is sitting  Denies injuries  Hard time sleeping last night due to pain  Pt then disclosed that she is currently in [place] until 2/6  Advised pt she needs to seek care in IC in [place] to determine cause of pain and treat as needed. Pt agreeable to plan

## 2025-02-06 NOTE — PROGRESS NOTES
Christina Allan is a 70 year old female.    Chief Complaint   Patient presents with    Follow - Up     Lower abdominal pain Finished antibiotics  Feeling better       HPI:   here for follow up   seen 1/20 at [facility] for abd pain.    Labs and imaging reviewed.  CT consistent with diverticulitis.  Treated with cipro flagyl  10 days   she is doing well.    no previous hx of diverticulitis  Colonoscopy December 2023 with Dr Altamirano.    Still with intermittent chest fluttering/discomfort midsternal after eating.  Not related to exertion.  No SOB.  Episodes have become more frequent and more intense recently.  Associated with food.   Follows at [facility] margial zone lymphoma  Waldenstrom macroglobulinemia  Dexa in care everywhere.   Discussed PCV 20.  She will consider.      Patient Active Problem List   Diagnosis    Waldenstrom macroglobulinemia    Non Hodgkin's lymphoma (HCC)    Change in bowel habits    History of basal cell carcinoma (BCC)    Chronic lymphocytic thyroiditis    Esophageal reflux    H/O nasal septoplasty    Hypertrophy of nasal turbinates    Intermittent diarrhea    Polyarthritis    Rosacea    Seborrheic keratosis    Other acne    Chemotherapy-induced nausea and vomiting    Cherry angioma    Hyperlipidemia, mixed    Immunocompromised patient (HCC)    Lentigines    Actinic keratosis    Inflamed seborrheic keratosis    SK (seborrheic keratosis)    Malignant lymphoplasmacytic lymphoma (HCC)    Marginal zone lymphoma (HCC)    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 90 tablet 3    ezetimibe (ZETIA) 10 MG Oral Tab Zetia 10 mg tablet, [RxNorm: 225232]      Calcium Carb-Cholecalciferol 500-10 MG-MCG Oral Tab Calcium 500 + D 500 mg-10 mcg (400 unit) tablet, [RxNorm: 964867]      riTUXimab (RITUXAN IV) Inject into the vein. EVERY 2  MONTHS      RESTASIS 0.05 % Ophthalmic Emulsion Place 1 drop into both eyes 2 (two) times daily.      sulfamethoxazole-trimethoprim -160 MG Oral Tab per tablet Take 1 tablet by mouth 3 (three) times a week.      acyclovir 400 MG Oral Tab Take 1 tablet (400 mg total) by mouth daily.      Omega-3 Fatty Acids (FISH OIL OR) Take by mouth.      Levothyroxine Sodium (SYNTHROID) 50 MCG Oral Tab TAKE 1 TABLET BY MOUTH EVERY DAY 90 tablet 3    Calcium Carbonate (CALCIUM 500 OR) Take  by mouth.      Cholecalciferol (VITAMIN D) 1000 UNITS Oral Tab Take  by mouth.      Vitamin B-12 (VITAMIN B12) 1000 MCG Oral Tab Take 1 tablet (1,000 mcg total) by mouth daily.        Past Medical History:    Abnormal Pap smear    Anemia    Basal cell cancer    Belching    Black stools    Blood in the stool    Body piercing    Chemotherapy-induced nausea and vomiting    Diarrhea, unspecified    Disorder of thyroid    Enlarged lymph node    Esophageal reflux    Flatulence/gas pain/belching    Frequent urination    Glaucoma    Heartburn    Hemorrhoids    High cholesterol    Hoarseness, chronic    HYPOTHYROIDISM    Indigestion    Irregular bowel habits    Leaking of urine    Night sweats    Non Hodgkin's lymphoma (HCC)    waldenstonstroms macro globulen anemia    Pain with bowel movements    Personal history of antineoplastic chemotherapy    ENDED SPRING 2022. CURRENTLY ON RITUXAN TREATMENTS    PONV (postoperative nausea and vomiting)    Sleep disturbance    Stool incontinence    Syncope    Thyroid disease    Usual hyperplasia of lactiferous duct    Visual impairment    glasses    Wears glasses    Weight gain      Social History:  Social History     Socioeconomic History    Marital status:    Tobacco Use    Smoking status: Never    Smokeless tobacco: Never   Vaping Use    Vaping status: Never Used   Substance and Sexual Activity    Alcohol use: Yes     Alcohol/week: 2.0 standard drinks of alcohol     Comment: Social.    Drug use: Never     Sexual activity: Yes     Partners: Male   Other Topics Concern    Caffeine Concern Yes     Comment: 1 cup daily    Exercise Yes     Comment: 3x per week     Social Drivers of Health     Food Insecurity: No Food Insecurity (10/6/2024)    Received from [facility]    Hunger Vital Sign     Worried About Running Out of Food in the Last Year: Never true     Ran Out of Food in the Last Year: Never true   Transportation Needs: No Transportation Needs (10/6/2024)    Received from [facility]    PRAPARE - Transportation     Lack of Transportation (Medical): No     Lack of Transportation (Non-Medical): No   Housing Stability: Unknown (10/6/2024)    Received from [facility]    Housing Stability Vital Sign     Unable to Pay for Housing in the Last Year: No     Family History   Problem Relation Age of Onset    Hypertension Maternal Grandmother     Stroke Maternal Grandmother     Heart Disease Mother         hardening of the arteries    Cancer Mother         breast/bone    Breast Cancer Mother 78        78    Other (MS) Sister              Other (lupus) Maternal Aunt                  Allergies  Allergies[1]    REVIEW OF SYSTEMS:   GENERAL HEALTH: feels better  RESPIRATORY: denies shortness of breath with exertion, no cough  CARDIOVASCULAR: denies chest pain on exertion, no palpatations  GI: as above     NEURO: denies headaches,     EXAM:   /72 (BP Location: Left arm, Patient Position: Sitting, Cuff Size: adult)   Pulse 68   Temp 97.4 °F (36.3 °C) (Temporal)   Resp 16   Ht 5' 4\" (1.626 m)   Wt 148 lb 6.4 oz (67.3 kg)   LMP  (LMP Unknown)   SpO2 98%   BMI 25.47 kg/m²   GENERAL: well developed, well nourished,in no apparent distress  GI: normal bowel sounds, no masses, soft tenderness  EXTREMITIES: no edema, normal strength and tone  PSYCH: alert and oriented x 3    ASSESSMENT AND PLAN:     Encounter Diagnoses   Name Primary?    Diverticulitis  s/p  treatment.  No previous hx.  Will f/u with Dr Altamirano as she is having some upper GI issues and would probably benefit from EGD given her history.  Continue PPI Yes    Immunocompromised patient (HCC)        No orders of the defined types were placed in this encounter.  Code selection for this visit was based on time spent (>30min) on date of service in preparing to see the patient, obtaining and/or reviewing separately obtained history, performing a medically appropriate examination, counseling and educating the patient/family/caregiver, ordering medications or testing, referring and communicating with other healthcare providers, documenting clinical information in the EHR, independently interpreting results and communicating results to the patient/family/caregiver and care coordination with the patient's other providers.        Meds & Refills for this Visit:  Requested Prescriptions      No prescriptions requested or ordered in this encounter       Imaging & Consults:  EVALUATE & TREAT, GASTRO (INTERNAL)    No follow-ups on file.  There are no Patient Instructions on file for this visit.         [1] No Known Allergies

## 2025-02-10 ENCOUNTER — OFFICE VISIT (OUTPATIENT)
Dept: INTERNAL MEDICINE CLINIC | Facility: CLINIC | Age: 70
End: 2025-02-10
Payer: MEDICARE

## 2025-02-10 VITALS
BODY MASS INDEX: 25.33 KG/M2 | DIASTOLIC BLOOD PRESSURE: 72 MMHG | SYSTOLIC BLOOD PRESSURE: 112 MMHG | RESPIRATION RATE: 16 BRPM | TEMPERATURE: 97 F | HEIGHT: 64 IN | HEART RATE: 68 BPM | OXYGEN SATURATION: 98 % | WEIGHT: 148.38 LBS

## 2025-02-10 DIAGNOSIS — D84.9 IMMUNOCOMPROMISED PATIENT (HCC): ICD-10-CM

## 2025-02-10 DIAGNOSIS — K57.92 DIVERTICULITIS: Primary | ICD-10-CM

## 2025-02-10 PROBLEM — Z87.19 HISTORY OF DIVERTICULITIS: Status: ACTIVE | Noted: 2025-02-10

## 2025-02-10 PROCEDURE — G2211 COMPLEX E/M VISIT ADD ON: HCPCS | Performed by: NURSE PRACTITIONER

## 2025-02-10 PROCEDURE — 99214 OFFICE O/P EST MOD 30 MIN: CPT | Performed by: NURSE PRACTITIONER

## 2025-03-14 ENCOUNTER — PATIENT MESSAGE (OUTPATIENT)
Dept: INTERNAL MEDICINE CLINIC | Facility: CLINIC | Age: 70
End: 2025-03-14

## 2025-05-08 ENCOUNTER — TELEPHONE (OUTPATIENT)
Dept: INTERNAL MEDICINE CLINIC | Facility: CLINIC | Age: 70
End: 2025-05-08

## 2025-05-08 ENCOUNTER — OFFICE VISIT (OUTPATIENT)
Dept: INTERNAL MEDICINE CLINIC | Facility: CLINIC | Age: 70
End: 2025-05-08
Payer: MEDICARE

## 2025-05-08 VITALS
RESPIRATION RATE: 18 BRPM | OXYGEN SATURATION: 98 % | HEIGHT: 64 IN | DIASTOLIC BLOOD PRESSURE: 80 MMHG | BODY MASS INDEX: 25.13 KG/M2 | SYSTOLIC BLOOD PRESSURE: 114 MMHG | TEMPERATURE: 98 F | HEART RATE: 68 BPM | WEIGHT: 147.19 LBS

## 2025-05-08 DIAGNOSIS — R09.81 SINUS CONGESTION: ICD-10-CM

## 2025-05-08 DIAGNOSIS — E78.2 HYPERLIPIDEMIA, MIXED: Primary | ICD-10-CM

## 2025-05-08 DIAGNOSIS — C83.00 MALIGNANT LYMPHOPLASMACYTIC LYMPHOMA (HCC): ICD-10-CM

## 2025-05-08 DIAGNOSIS — H57.89 EYE DISCHARGE: Primary | ICD-10-CM

## 2025-05-08 DIAGNOSIS — D84.9 IMMUNOCOMPROMISED PATIENT (HCC): ICD-10-CM

## 2025-05-08 DIAGNOSIS — R07.9 CHEST PAIN, UNSPECIFIED TYPE: ICD-10-CM

## 2025-05-08 DIAGNOSIS — Z13.29 SCREENING FOR THYROID DISORDER: ICD-10-CM

## 2025-05-08 LAB
ATRIAL RATE: 64 BPM
P AXIS: 52 DEGREES
P-R INTERVAL: 174 MS
Q-T INTERVAL: 424 MS
QRS DURATION: 78 MS
QTC CALCULATION (BEZET): 437 MS
R AXIS: 42 DEGREES
T AXIS: 49 DEGREES
VENTRICULAR RATE: 64 BPM

## 2025-05-08 PROCEDURE — 99214 OFFICE O/P EST MOD 30 MIN: CPT | Performed by: NURSE PRACTITIONER

## 2025-05-08 PROCEDURE — 93000 ELECTROCARDIOGRAM COMPLETE: CPT | Performed by: NURSE PRACTITIONER

## 2025-05-08 RX ORDER — FLUTICASONE PROPIONATE 50 MCG
1 SPRAY, SUSPENSION (ML) NASAL 2 TIMES DAILY
Qty: 1 EACH | Refills: 1 | Status: SHIPPED | OUTPATIENT
Start: 2025-05-08

## 2025-05-08 NOTE — PROGRESS NOTES
The following individual(s) verbally consented to be recorded using ambient AI listening technology and understand that they can each withdraw their consent to this listening technology at any point by asking the clinician to turn off or pause the recording:    Patient name: Christina Allan  Additional names:        Christina Allan is a 70 year old female.    Chief Complaint   Patient presents with    Sinus Problem     C/o crusty eyes and sinus pressure in eyes and head       HPI:     History of Present Illness  Jasmin Allan is a 70 year old female who presents with intermittent eye discharge and pressure headaches since March.    Since the end of March, she experiences episodes of waking up with her eye sealed shut due to discharge, initially thought to be conjunctivitis. The discharge is intermittent, sometimes significant. She denies fevers, chills, Headaches occur at the end of the day with a sensation of pressure behind her eyes, severe enough to make her glasses uncomfortable. She denies runny nose, or worsening of headaches when lying down or bending forward. She occasionally uses Sudafed or Benadryl for severe symptoms which seems to help.  discussed allergies, but does not take daily allergy medications.    She experienced an episode of feeling faint and nauseous while out for dinner, leading to vomiting. Since then, she has had intermittent chest pain without exertional pain, shortness of breath, or nausea. She is on pantoprazole for acid reflux, which has been well-controlled until recently.    EKG  normal rate normal rhythm.  No acute findings.    She will call Dr Dalton.        Problem List[1]  Current Medications[2]   Past Medical History[3]   Social History:  Short Social Hx on File[4]  Family History[5]     Allergies  Allergies[6]    REVIEW OF SYSTEMS:   GENERAL HEALTH:as above   RESPIRATORY: denies shortness of breath with exertion, no cough  CARDIOVASCULAR:as above   GI: as  above   MUSCULOSKELETAL:  No arthralgias or myalgias  NEURO: headaches,     EXAM:   /80   Pulse 68   Temp 97.9 °F (36.6 °C)   Resp 18   Ht 5' 4\" (1.626 m)   Wt 147 lb 3.2 oz (66.8 kg)   LMP  (LMP Unknown)   SpO2 98%   BMI 25.27 kg/m²   GENERAL: well developed, well nourished,in no apparent distress  HEENT: atraumatic, normocephalic,ears w fluid filled TM no erythema.  throat clear  frontal sinus tenderness  NECK: supple,no adenopathy,  LUNGS: normal rate without respiratory distress, lungs clear to auscultation  CARDIO: RRR  EXTREMITIES: no edema, normal strength and tone  PSYCH: alert and oriented x 3    ASSESSMENT AND PLAN:     Assessment & Plan  Intermittent Chest Pain   Differential includes cardiac etiology versus GERD exacerbation.  - EKG unremarkable   continue PPI.  - Instruct her to call Dr. Chirinos's office for an appointment.    Seasonal Allergic Rhinitis/sinusitis.    Symptoms suggestive of seasonal allergic rhinitis. Explained sinus congestion from allergies can cause eye discharge due to tear duct drainage into sinuses.  - Start Claritin, Zyrtec, or Allegra daily.  - Use Flonase nasal spray, one spray each nostril twice a day.  - Provide antibiotic prescription to use if symptoms do not improve in a week.    Will follow up for AWV and this will provide f/u for above.   Call with unresolved symptoms.      Encounter Diagnoses   Name Primary?    Eye discharge Yes    Sinus congestion     Chest pain, unspecified type     Immunocompromised patient (HCC)     Malignant lymphoplasmacytic lymphoma (HCC)         No orders of the defined types were placed in this encounter.      Meds & Refills for this Visit:  Requested Prescriptions     Signed Prescriptions Disp Refills    amoxicillin clavulanate 875-125 MG Oral Tab 20 tablet 0     Sig: Take 1 tablet by mouth 2 (two) times daily for 10 days.    fluticasone propionate 50 MCG/ACT Nasal Suspension 1 each 1     Si spray by Nasal route 2 (two)  times daily.       Imaging & Consults:  ELECTROCARDIOGRAM, COMPLETE    No follow-ups on file.  There are no Patient Instructions on file for this visit.       [1]   Patient Active Problem List  Diagnosis    Waldenstrom macroglobulinemia    Non Hodgkin's lymphoma (HCC)    Change in bowel habits    History of basal cell carcinoma (BCC)    Chronic lymphocytic thyroiditis    Esophageal reflux    H/O nasal septoplasty    Hypertrophy of nasal turbinates    Intermittent diarrhea    Polyarthritis    Rosacea    Seborrheic keratosis    Other acne    Chemotherapy-induced nausea and vomiting    Cherry angioma    Hyperlipidemia, mixed    Immunocompromised patient (HCC)    Lentigines    Actinic keratosis    Inflamed seborrheic keratosis    SK (seborrheic keratosis)    Malignant lymphoplasmacytic lymphoma (HCC)    Marginal zone lymphoma (HCC)    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 20 tablet 0    fluticasone propionate 50 MCG/ACT Nasal Suspension 1 spray by Nasal route 2 (two) times daily. 1 each 1    pantoprazole 40 MG Oral Tab EC Take 1 tablet (40 mg total) by mouth before breakfast. 90 tablet 3    ezetimibe (ZETIA) 10 MG Oral Tab Zetia 10 mg tablet, [RxNorm: 625591]      Calcium Carb-Cholecalciferol 500-10 MG-MCG Oral Tab Calcium 500 + D 500 mg-10 mcg (400 unit) tablet, [RxNorm: 537412]      RESTASIS 0.05 % Ophthalmic Emulsion Place 1 drop into both eyes 2 (two) times daily.      sulfamethoxazole-trimethoprim -160 MG Oral Tab per tablet Take 1 tablet by mouth 3 (three) times a week.      acyclovir 400 MG Oral Tab Take 1 tablet (400 mg total) by mouth daily.      Omega-3 Fatty Acids (FISH OIL OR) Take by mouth.      Levothyroxine Sodium (SYNTHROID) 50 MCG Oral Tab TAKE 1 TABLET BY MOUTH EVERY  DAY 90 tablet 3    Calcium Carbonate (CALCIUM 500 OR) Take  by mouth.      Cholecalciferol (VITAMIN D) 1000 UNITS Oral Tab Take  by mouth.      Vitamin B-12 (VITAMIN B12) 1000 MCG Oral Tab Take 1 tablet (1,000 mcg total) by mouth daily.     [3]   Past Medical History:   Abnormal Pap smear    Anemia    Basal cell cancer    Belching    Black stools    Blood in the stool    Body piercing    Chemotherapy-induced nausea and vomiting    Diarrhea, unspecified    Disorder of thyroid    Enlarged lymph node    Esophageal reflux    Flatulence/gas pain/belching    Frequent urination    Glaucoma    Heartburn    Hemorrhoids    High cholesterol    Hoarseness, chronic    HYPOTHYROIDISM    Indigestion    Irregular bowel habits    Leaking of urine    Night sweats    Non Hodgkin's lymphoma (HCC)    waldenstonstroms macro globulen anemia    Pain with bowel movements    Personal history of antineoplastic chemotherapy    ENDED SPRING 2022. CURRENTLY ON RITUXAN TREATMENTS    PONV (postoperative nausea and vomiting)    Sleep disturbance    Stool incontinence    Syncope    Thyroid disease    Usual hyperplasia of lactiferous duct    Visual impairment    glasses    Wears glasses    Weight gain   [4]   Social History  Socioeconomic History    Marital status:    Tobacco Use    Smoking status: Never    Smokeless tobacco: Never   Vaping Use    Vaping status: Never Used   Substance and Sexual Activity    Alcohol use: Yes     Alcohol/week: 2.0 standard drinks of alcohol     Comment: Social.    Drug use: Never    Sexual activity: Yes     Partners: Male   Other Topics Concern    Caffeine Concern Yes     Comment: 1 cup daily    Exercise Yes     Comment: 3x per week     Social Drivers of Health     Food Insecurity: No Food Insecurity (10/6/2024)    Received from [facility]    Hunger Vital Sign     Worried About Running Out of Food in the Last Year: Never true     Ran Out of Food in the Last Year: Never true   Transportation  Needs: No Transportation Needs (10/6/2024)    Received from [facility]    PRAPARE - Transportation     Lack of Transportation (Medical): No     Lack of Transportation (Non-Medical): No   Housing Stability: Unknown (10/6/2024)    Received from [facility]    Housing Stability Vital Sign     Unable to Pay for Housing in the Last Year: No   [5]   Family History  Problem Relation Age of Onset    Hypertension Maternal Grandmother     Stroke Maternal Grandmother     Heart Disease Mother         hardening of the arteries    Cancer Mother         breast/bone    Breast Cancer Mother 78        78    Other (MS) Sister              Other (lupus) Maternal Aunt             [6] No Known Allergies

## 2025-06-16 ENCOUNTER — OFFICE VISIT (OUTPATIENT)
Dept: NEUROLOGY | Facility: CLINIC | Age: 70
End: 2025-06-16
Payer: MEDICARE

## 2025-06-16 DIAGNOSIS — Z91.199 NO-SHOW FOR APPOINTMENT: Primary | ICD-10-CM

## 2025-06-18 ENCOUNTER — OFFICE VISIT (OUTPATIENT)
Dept: NEUROLOGY | Facility: CLINIC | Age: 70
End: 2025-06-18
Payer: MEDICARE

## 2025-06-18 VITALS
DIASTOLIC BLOOD PRESSURE: 70 MMHG | SYSTOLIC BLOOD PRESSURE: 120 MMHG | HEART RATE: 80 BPM | RESPIRATION RATE: 16 BRPM | WEIGHT: 145.81 LBS | BODY MASS INDEX: 25 KG/M2

## 2025-06-18 DIAGNOSIS — R20.2 DISTAL PARESTHESIA: Primary | ICD-10-CM

## 2025-06-18 DIAGNOSIS — R79.9 ABNORMAL FINDING OF BLOOD CHEMISTRY, UNSPECIFIED: ICD-10-CM

## 2025-06-18 DIAGNOSIS — G62.9 POLYNEUROPATHY: ICD-10-CM

## 2025-06-18 DIAGNOSIS — R42 INTERMITTENT LIGHTHEADEDNESS: ICD-10-CM

## 2025-06-18 PROCEDURE — 99204 OFFICE O/P NEW MOD 45 MIN: CPT | Performed by: OTHER

## 2025-06-18 NOTE — PROGRESS NOTES
Reports a \"tingling sensation\" to her torso for several years. 9 years ago was treated for lymphplasmacytic lymphoma with chemo. Sensation has been growing more intense, and occurs for no reason. Does notice it can happen after eating sweets, but  very rarely.  Also reports lightheadedness over past 3 weeks doesn't matter the activity.

## 2025-06-18 NOTE — PATIENT INSTRUCTIONS
Refill policies:    Allow 2-3 business days for refills; controlled substances may take longer.  Contact your pharmacy at least 5 days prior to running out of medication and have them send an electronic request or submit request through the “request refill” option in your [employer] account.  Refills are not addressed on weekends; covering physicians do not authorize routine medications on weekends.  No narcotics or controlled substances are refilled after noon on Fridays or by on call physicians.  By law, narcotics must be electronically prescribed.  A 30 day supply with no refills is the maximum allowed.  If your prescription is due for a refill, you may be due for a follow up appointment.  To best provide you care, patients receiving routine medications need to be seen at least once a year.  Patients receiving narcotic/controlled substance medications need to be seen at least once every 3 months.  In the event that your preferred pharmacy does not have the requested medication in stock (e.g. Backordered), it is your responsibility to find another pharmacy that has the requested medication available.  We will gladly send a new prescription to that pharmacy at your request.    Scheduling Tests:    If your physician has ordered radiology tests such as MRI or CT scans, please contact Central Scheduling at [phone] right away to schedule the test.  Once scheduled, the [facility] Centralized Referral Team will work with your insurance carrier to obtain pre-certification or prior authorization.  Depending on your insurance carrier, approval may take 3-10 days.  It is highly recommended patients assure they have received an authorization before having a test performed.  If test is done without insurance authorization, patient may be responsible for the entire amount billed.      Precertification and Prior Authorizations:  If your physician has recommended that you have a procedure or additional testing performed the [facility]  Centralized Referral Team will contact your insurance carrier to obtain pre-certification or prior authorization.    You are strongly encouraged to contact your insurance carrier to verify that your procedure/test has been approved and is a COVERED benefit.  Although the [facility] Centralized Referral Team does its due diligence, the insurance carrier gives the disclaimer that \"Although the procedure is authorized, this does not guarantee payment.\"    Ultimately the patient is responsible for payment.   Thank you for your understanding in this matter.  Paperwork Completion:  If you require FMLA or disability paperwork for your recovery, please make sure to either drop it off or have it faxed to our office at [phone]. Be sure the form has your name and date of birth on it.  The form will be faxed to our Forms Department and they will complete it for you.  There is a 25$ fee for all forms that need to be filled out.  Please be aware there is a 10-14 day turnaround time.  You will need to sign a release of information (KEI) form if your paperwork does not come with one.  You may call the Forms Department with any questions at [phone].  Their fax number is [phone].

## 2025-06-18 NOTE — PROGRESS NOTES
[facility] - [facility]   Neurology    Christina Allan Patient Status:  No patient class for patient encounter    1955 MRN [id number]   Location [facility], [place] PCP Colten Blackman,               HPI:   Christina Allan is a(n) 70 year old female who presents at the request of Tarsha Yeboah for evaluation of intermittent paresthesias in the torso. Had chemotherapy for lymphoplasmacytic lymphoma 9 years ago. Since then, has intermittent tingling in the torso. Seem to be getting stronger. Started months after chemo. Lasts 30-60 seconds, and can wake her up. Always the same episode. Starts as a mild tingling in her torso, from chest to lower abdomin, then over seconds increases to marked tingling. Sometimes at times there will be a hot flash feeling all over her body. Can occur at night, or during the day. No known triggers. Occurs 2-3 times per day. Now feels like the tingling sensation is more intense.   Has been getting LH in the last 3 weeks, whether sitting or standing. Lasts a few seconds, can occur any time of day. Has not noted any triggers. Occurs twice daily. Denies headaches, but does get neck pain that radiates to the occiput, chronic. Denies chest pain, palpitations, nausea. Has a history of ocular migraines.    Pertinent imaging and laboratory work-up:   2024 [facility]  EEG  IMPRESSION: This is a normal recording for patient's age. There is no epileptiform activity identified during this recording; however, a normal EEG study does not rule out clinical seizure disorder. Correlation clinical is indicated.     Past Medical History:  Past Medical History:    Abnormal Pap smear    Anemia    Basal cell cancer    Belching    Black stools    Blood in the stool    Body piercing    Chemotherapy-induced nausea and vomiting    Diarrhea, unspecified    Disorder of thyroid    Enlarged lymph node    Esophageal reflux    Flatulence/gas  pain/belching    Frequent urination    Glaucoma    Heartburn    Hemorrhoids    High cholesterol    Hoarseness, chronic    HYPOTHYROIDISM    Indigestion    Irregular bowel habits    Leaking of urine    Night sweats    Non Hodgkin's lymphoma (HCC)    waldenstonstroms macro globulen anemia    Pain with bowel movements    Personal history of antineoplastic chemotherapy    ENDED SPRING 2022. CURRENTLY ON RITUXAN TREATMENTS    PONV (postoperative nausea and vomiting)    Sleep disturbance    Stool incontinence    Syncope    Thyroid disease    Usual hyperplasia of lactiferous duct    Visual impairment    glasses    Wears glasses    Weight gain        Past Surgical History:  Past Surgical History:   Procedure Laterality Date    Colonoscopy      Colonoscopy  03/08/2018    repeat 10years Dr. Altamirano    Colonoscopy N/A 12/28/2021    Procedure: COLONOSCOPY;  Surgeon: Mynor Altamirano MD;  Location:  ENDOSCOPY    Colonoscopy N/A 12/26/2023    Procedure: COLONOSCOPY;  Surgeon: Mynor Altamirano MD;  Location:  ENDOSCOPY    Colposcopy, cervix w/upper adjacent vagina; w/endocervical curettage  2005    Excisional biopsy left  12/2019    radial scar and usual ductal hyperplasia    Other surgical history  01/2010     Nasal surgery    Other surgical history      turbinectomy    Other surgical history  02/2016    uvula removed    Other surgical history  12/05/2019    Wire loc and exc left breast mass    Other surgical history  03/05/2021    EXCISION OF CYST ON RIGHT ARM - DJP     Tonsillectomy      Upper gi endoscopy,exam         Family History:  family history includes Breast Cancer (age of onset: 78) in her mother; Cancer in her mother; Heart Disease in her mother; Hypertension in her maternal grandmother; MS in her sister; Stroke in her maternal grandmother; lupus in her maternal aunt.      Social History:   reports that she has never smoked. She has never been exposed to tobacco smoke. She has never used smokeless tobacco.  She reports current alcohol use of about 2.0 standard drinks of alcohol per week. She reports that she does not use drugs.    Allergies:  No Known Allergies    MEDICATIONS:    Current Outpatient Medications:     pantoprazole 40 MG Oral Tab EC, Take 1 tablet (40 mg total) by mouth before breakfast., Disp: 90 tablet, Rfl: 3    ezetimibe (ZETIA) 10 MG Oral Tab, Zetia 10 mg tablet, [RxNorm: 035687], Disp: , Rfl:     RESTASIS 0.05 % Ophthalmic Emulsion, Place 1 drop into both eyes 2 (two) times daily., Disp: , Rfl:     sulfamethoxazole-trimethoprim -160 MG Oral Tab per tablet, Take 1 tablet by mouth 3 (three) times a week., Disp: , Rfl:     acyclovir 400 MG Oral Tab, Take 1 tablet (400 mg total) by mouth daily., Disp: , Rfl:     Levothyroxine Sodium (SYNTHROID) 50 MCG Oral Tab, TAKE 1 TABLET BY MOUTH EVERY DAY, Disp: 90 tablet, Rfl: 3    fluticasone propionate 50 MCG/ACT Nasal Suspension, 1 spray by Nasal route 2 (two) times daily. (Patient not taking: Reported on 6/18/2025), Disp: 1 each, Rfl: 1    Calcium Carb-Cholecalciferol 500-10 MG-MCG Oral Tab, Calcium 500 + D 500 mg-10 mcg (400 unit) tablet, [RxNorm: 570122] (Patient not taking: Reported on 6/18/2025), Disp: , Rfl:     Omega-3 Fatty Acids (FISH OIL OR), Take by mouth. (Patient not taking: Reported on 6/18/2025), Disp: , Rfl:     Calcium Carbonate (CALCIUM 500 OR), Take  by mouth. (Patient not taking: Reported on 6/18/2025), Disp: , Rfl:     Cholecalciferol (VITAMIN D) 1000 UNITS Oral Tab, Take  by mouth. (Patient not taking: Reported on 6/18/2025), Disp: , Rfl:     Vitamin B-12 (VITAMIN B12) 1000 MCG Oral Tab, Take 1 tablet (1,000 mcg total) by mouth daily. (Patient not taking: Reported on 6/18/2025), Disp: , Rfl:       Review of Systems:   A comprehensive 10 point review of systems was completed.     Pertinent positives and negatives noted in the HPI.         PHYSICAL EXAM:   Neurological Exam  Vitals  Vitals:    06/18/25 0907   BP: 120/70   Pulse: 80    Resp: 16     General Appearance: Patient is a 70 year old female in no acute distress  Cardiac: Normal rate & regular rhythm  Skin: There are no rashes or other skin lesions.  Musculoskeletal: There is no scoliosis, or joint deformities  Neurologic examination:  Mental status: Patient is alert, attentive, and oriented x 3. Language is coherent and fluent without aphasia. Memory, comprehension and ability to follow commands were intact.   Cranial nerves II-XII: Optic discs were sharp. Pupils were round and reacted to light. Extraocular movements were full. There was no face, jaw, palate or tongue weakness or atrophy. Facial sensation was normal. Hearing was grossly intact. Shoulder shrug was normal.   Motor exam revealed normal muscle bulk and tone. No atrophy or fasciculations. Manual muscle testing revealed MRC grade 5/5 strength throughout including proximal and distal muscles of the arms and legs.  Deep tendon reflexes were 2 at the biceps, brachioradialis, triceps, knee jerk, and ankle jerk. Plantar responses were flexor bilaterally.   Sensory exam revealed normal light touch perception. Vibratory perception and proprioception were intact at the toes. Pinprick and temperature were normal. Romberg sign was absent.  Complex motor skills revealed normal coordination. Finger-nose-finger intact.   Gait was narrow and stable, was able to walk on heels, toes and tandem without any difficulty.     ASSESSMENT/ACTIVE PROBLEM LIST:     Encounter Diagnoses   Name Primary?    Distal paresthesia Yes    Intermittent lightheadedness     Polyneuropathy     Abnormal finding of blood chemistry, unspecified        Discussion/Plan:  Chronic episodes of central trunk paresthesias, lasting seconds  Differential includes partial seizures. No signs of small fiber neuropathy, but an atypical small fiber neuropathy is another theoretical consideration  Obtain EEG  Obtain MRI brain  Check B6, B12, A1c, thiamine  If routine EEG negative,  will recommend 24 hour EEG    Intermittent brief episodes of lightheadedness, even when sitting   Recommend cardiac evaluation. Very unlikely atypical partial seizures  Call cardiologist and PCP  Obtain EEG    Requested Prescriptions      No prescriptions requested or ordered in this encounter          We discussed in depth regarding the diagnosis, prognosis, treatment. The patient was given ample opportunity to ask questions. All questions and concerns were addressed.     Sari Good DO  Neuromuscular and General Neurology  [facility]

## 2025-06-19 ENCOUNTER — HOSPITAL ENCOUNTER (OUTPATIENT)
Dept: MRI IMAGING | Age: 70
Discharge: HOME | End: 2025-06-19
Attending: Other
Payer: MEDICARE

## 2025-06-19 DIAGNOSIS — R20.2 DISTAL PARESTHESIA: ICD-10-CM

## 2025-06-19 DIAGNOSIS — R42 INTERMITTENT LIGHTHEADEDNESS: ICD-10-CM

## 2025-06-19 PROCEDURE — 70551 MRI BRAIN STEM W/O DYE: CPT | Performed by: OTHER

## 2025-06-20 LAB
ABSOLUTE BASOPHILS: 38 CELLS/UL (ref 0–200)
ABSOLUTE EOSINOPHILS: 19 CELLS/UL (ref 15–500)
ABSOLUTE LYMPHOCYTES: 355 CELLS/UL (ref 850–3900)
ABSOLUTE MONOCYTES: 326 CELLS/UL (ref 200–950)
ABSOLUTE NEUTROPHILS: 1661 CELLS/UL (ref 1500–7800)
ALBUMIN/GLOBULIN RATIO: 2.5 (CALC) (ref 1–2.5)
ALBUMIN: 4.8 G/DL (ref 3.6–5.1)
ALKALINE PHOSPHATASE: 72 U/L (ref 37–153)
ALT: 29 U/L (ref 6–29)
AST: 26 U/L (ref 10–35)
BASOPHILS: 1.6 %
BILIRUBIN, TOTAL: 0.9 MG/DL (ref 0.2–1.2)
BUN: 9 MG/DL (ref 7–25)
CALCIUM: 9.7 MG/DL (ref 8.6–10.4)
CARBON DIOXIDE: 27 MMOL/L (ref 20–32)
CHLORIDE: 104 MMOL/L (ref 98–110)
CHOL/HDLC RATIO: 2.5 (CALC)
CHOLESTEROL, TOTAL: 220 MG/DL
CREATININE: 0.75 MG/DL (ref 0.5–1.05)
EGFR: 86 ML/MIN/1.73M2
EOSINOPHILS: 0.8 %
GLOBULIN: 1.9 G/DL (CALC) (ref 1.9–3.7)
GLUCOSE: 92 MG/DL (ref 65–99)
HDL CHOLESTEROL: 88 MG/DL
HEMATOCRIT: 40.8 % (ref 35–45)
HEMOGLOBIN: 13.7 G/DL (ref 11.7–15.5)
LDL-CHOLESTEROL: 117 MG/DL (CALC)
LYMPHOCYTES: 14.8 %
MCH: 33.3 PG (ref 27–33)
MCHC: 33.6 G/DL (ref 32–36)
MCV: 99.3 FL (ref 80–100)
MONOCYTES: 13.6 %
MPV: 10.5 FL (ref 7.5–12.5)
NEUTROPHILS: 69.2 %
NON-HDL CHOLESTEROL: 132 MG/DL (CALC)
PLATELET COUNT: 259 THOUSAND/UL (ref 140–400)
POTASSIUM: 4.5 MMOL/L (ref 3.5–5.3)
PROTEIN, TOTAL: 6.7 G/DL (ref 6.1–8.1)
RDW: 13 % (ref 11–15)
RED BLOOD CELL COUNT: 4.11 MILLION/UL (ref 3.8–5.1)
SODIUM: 140 MMOL/L (ref 135–146)
TRIGLYCERIDES: 54 MG/DL
TSH W/REFLEX TO FT4: 1.28 MIU/L (ref 0.4–4.5)
WHITE BLOOD CELL COUNT: 2.4 THOUSAND/UL (ref 3.8–10.8)

## 2025-06-23 ENCOUNTER — OFFICE VISIT (OUTPATIENT)
Dept: INTERNAL MEDICINE CLINIC | Facility: CLINIC | Age: 70
End: 2025-06-23
Payer: MEDICARE

## 2025-06-23 VITALS
SYSTOLIC BLOOD PRESSURE: 102 MMHG | WEIGHT: 145 LBS | OXYGEN SATURATION: 96 % | TEMPERATURE: 98 F | HEIGHT: 64.5 IN | BODY MASS INDEX: 24.45 KG/M2 | DIASTOLIC BLOOD PRESSURE: 70 MMHG | HEART RATE: 78 BPM | RESPIRATION RATE: 17 BRPM

## 2025-06-23 DIAGNOSIS — E06.3 CHRONIC LYMPHOCYTIC THYROIDITIS: ICD-10-CM

## 2025-06-23 DIAGNOSIS — C88.00 WALDENSTROM MACROGLOBULINEMIA: ICD-10-CM

## 2025-06-23 DIAGNOSIS — D84.9 IMMUNOCOMPROMISED PATIENT (HCC): ICD-10-CM

## 2025-06-23 DIAGNOSIS — G72.0 STATIN MYOPATHY: ICD-10-CM

## 2025-06-23 DIAGNOSIS — E78.2 HYPERLIPIDEMIA, MIXED: ICD-10-CM

## 2025-06-23 DIAGNOSIS — K21.9 GASTROESOPHAGEAL REFLUX DISEASE WITHOUT ESOPHAGITIS: ICD-10-CM

## 2025-06-23 DIAGNOSIS — T46.6X5A STATIN MYOPATHY: ICD-10-CM

## 2025-06-23 DIAGNOSIS — Z00.00 ROUTINE GENERAL MEDICAL EXAMINATION AT A HEALTH CARE FACILITY: Primary | ICD-10-CM

## 2025-06-23 DIAGNOSIS — Z85.828 HISTORY OF BASAL CELL CARCINOMA (BCC): ICD-10-CM

## 2025-06-23 DIAGNOSIS — R20.2 DISTAL PARESTHESIA: ICD-10-CM

## 2025-06-23 DIAGNOSIS — C85.80 MARGINAL ZONE LYMPHOMA (HCC): ICD-10-CM

## 2025-06-23 DIAGNOSIS — C83.00 MALIGNANT LYMPHOPLASMACYTIC LYMPHOMA (HCC): ICD-10-CM

## 2025-06-23 DIAGNOSIS — D72.819 LEUKOPENIA, UNSPECIFIED TYPE: ICD-10-CM

## 2025-06-23 DIAGNOSIS — Z87.19 HISTORY OF DIVERTICULITIS: ICD-10-CM

## 2025-06-23 DIAGNOSIS — R19.4 CHANGE IN BOWEL HABITS: ICD-10-CM

## 2025-06-23 DIAGNOSIS — C85.18 B-CELL LYMPHOMA OF LYMPH NODES OF MULTIPLE REGIONS, UNSPECIFIED B-CELL LYMPHOMA TYPE (HCC): ICD-10-CM

## 2025-06-23 PROBLEM — R11.2 CHEMOTHERAPY-INDUCED NAUSEA AND VOMITING: Status: RESOLVED | Noted: 2022-06-09 | Resolved: 2025-06-23

## 2025-06-23 PROBLEM — R19.7 INTERMITTENT DIARRHEA: Status: RESOLVED | Noted: 2017-09-24 | Resolved: 2025-06-23

## 2025-06-23 PROBLEM — T45.1X5A CHEMOTHERAPY-INDUCED NAUSEA AND VOMITING: Status: RESOLVED | Noted: 2022-06-09 | Resolved: 2025-06-23

## 2025-06-23 RX ORDER — PANTOPRAZOLE SODIUM 40 MG/1
40 TABLET, DELAYED RELEASE ORAL
Qty: 90 TABLET | Refills: 3 | Status: SHIPPED | OUTPATIENT
Start: 2025-06-23

## 2025-06-23 NOTE — PROGRESS NOTES
Subjective:   Christina Allan is a 70 year old female who presents for a Subsequent Annual Wellness visit (Pt already had Initial Annual Wellness) and scheduled follow up of multiple significant but stable problems.   History of Present Illness  Jasmin Allan is a 70 year old female who presents for AWV.    She manages acid reflux with pantoprazole, which effectively controls her symptoms.   She experiences increasing tingling and 'weird' sensations, for which a specialist consultation and a scan have been conducted. An EEG is suggested for further evaluation. Dr Good.     She takes ezetimibe for cholesterol management, with LDL at 112 and HDL at 88. Previous medications caused discomfort.  Considering repatha with [facility]    She has a history of diverticulitis   history basal cell carcinoma, with dermatologic monitoring every six months.   She follows up with her hematologist every three months, with no current diarrhea or recent episodes of chemo-induced nausea and vomiting.      History/Other:   Fall Risk Assessment:   She has been screened for Falls and is low risk.      Cognitive Assessment:   She had a completely normal cognitive assessment - see flowsheet entries       Functional Ability/Status:   Christina Allan has a completely normal functional assessment. See flowsheet for details.      Depression Screening (PHQ):  PHQ-2 SCORE: 0  , done 6/16/2025   If you checked off any problems, how difficult have these problems made it for you to do your work, take care of things at home, or get along with other people?: Not difficult at all    Last [place] Suicide Screening on 6/23/2025 was No Risk.          Advanced Directives:   She does have a Living Will but we do NOT have it on file in Epic.    She does have a POA but we do NOT have it on file in Epic.    Not discussed      Problem List[1]  Allergies:  She has no known allergies.    Current Medications:  Active Meds, Sig Only[2]    Medical  History:  She  has a past medical history of Abnormal Pap smear (2000), Anemia, Basal cell cancer, Belching, Black stools, Blood in the stool, Body piercing (Ears), Chemotherapy-induced nausea and vomiting (06/09/2022), Diarrhea, unspecified, Disorder of thyroid, Enlarged lymph node, Esophageal reflux, Flatulence/gas pain/belching, Frequent urination, Glaucoma, Heartburn (3-4 weeks), Hemorrhoids, High cholesterol, Hoarseness, chronic, HYPOTHYROIDISM, Indigestion, Irregular bowel habits, Leaking of urine, Night sweats, Non Hodgkin's lymphoma (HCC) (02/03/2016), Pain with bowel movements, Personal history of antineoplastic chemotherapy, PONV (postoperative nausea and vomiting), Sleep disturbance, Stool incontinence, Syncope (12/01/2017), Thyroid disease, Usual hyperplasia of lactiferous duct (2019), Visual impairment, Wears glasses, and Weight gain.  Surgical History:  She  has a past surgical history that includes colposcopy, cervix w/upper adjacent vagina; w/endocervical curettage (2005); colonoscopy; upper gi endoscopy,exam; colonoscopy (03/08/2018); tonsillectomy; excisional biopsy left (12/2019); other surgical history (01/2010 ); other surgical history; other surgical history (02/2016); other surgical history (12/05/2019); other surgical history (03/05/2021); colonoscopy (N/A, 12/28/2021); and colonoscopy (N/A, 12/26/2023).   Family History:  Her family history includes Breast Cancer (age of onset: 78) in her mother; Cancer in her mother; Heart Disease in her mother; Hypertension in her maternal grandmother; MS in her sister; Stroke in her maternal grandmother; lupus in her maternal aunt.  Social History:  She  reports that she has never smoked. She has never been exposed to tobacco smoke. She has never used smokeless tobacco. She reports current alcohol use of about 2.0 standard drinks of alcohol per week. She reports that she does not use drugs.    Tobacco:  She has never smoked tobacco.    CAGE Alcohol  Screen:   CAGE screening score of 0 on 6/16/2025, showing low risk of alcohol abuse.      Patient Care Team:  Colten Blackman DO as PCP - General (Internal Medicine)  Rashmi Good DO as Consulting Physician (NEUROLOGY)  Mynor Altamirano MD (GASTROENTEROLOGY)  Lamont Crystal MD (HEMATOLOGY)  Rosalia Hylton APRN (Nurse Practitioner)  Jorge Blackwell MD (CARDIOLOGY)  Radha Yeboah APRN (Nurse Practitioner Acute Care)  Radha Yeboah APRN (Nurse Practitioner)    Review of Systems     Negative except as above.     Objective:   Physical Exam  Physical Exam  General Appearance:  Alert, cooperative, no distress, appears stated age   Head:  Normocephalic, without obvious abnormality, atraumatic   Eyes:  PERRL, conjunctiva/corneas clear, EOM's intact both eyes   Ears:  Normal TM's and external ear canals, both ears   Nose: Nares normal, septum midline,mucosa normal, no drainage or sinus tenderness   Throat: Lips, mucosa, and tongue normal; teeth and gums normal   Neck: Supple, symmetrical, trachea midline, no adenopathy;  thyroid: not enlarged, symmetric, no JVD   Back:   Symmetric, no curvature, ROM normal, no CVA tenderness   Lungs:   Clear to auscultation bilaterally, respirations unlabored   Heart:  Regular rate and rhythm, S1 and S2 normal,    Abdomen:   Soft, non-tender, bowel sounds active all four quadrants,  no masses,    Extremities: Extremities normal, atraumatic, no edema   Pulses: symmetric   Skin: Skin color, texture, turgor normal,    Lymph nodes: Cervical, supraclavicular nodes normal   Neurologic: Normal          /70 (BP Location: Right arm, Patient Position: Sitting, Cuff Size: adult)   Pulse 78   Temp 97.8 °F (36.6 °C) (Temporal)   Resp 17   Ht 5' 4.5\" (1.638 m)   Wt 145 lb (65.8 kg)   LMP  (LMP Unknown)   SpO2 96%   Breastfeeding No   BMI 24.50 kg/m²  Estimated body mass index is 24.5 kg/m² as calculated from the following:    Height as of this encounter: 5' 4.5\" (1.638  m).    Weight as of this encounter: 145 lb (65.8 kg).    Medicare Hearing Assessment:   Hearing Screening    Time taken: 6/23/2025 11:15 AM  Entry User: Alexsandra Alicea CMA  Screening Method: Finger Rub  Finger Rub Result: Pass         Visual Acuity:   Right Eye Visual Acuity: Corrected Right Eye Chart Acuity: 20/30   Left Eye Visual Acuity: Corrected Left Eye Chart Acuity: 20/50   Both Eyes Visual Acuity: Corrected Both Eyes Chart Acuity: 20/40   Able To Tolerate Visual Acuity: Yes        Assessment & Plan:   Christina Allan is a 70 year old female who presents for a Medicare Assessment.     1. Routine general medical examination at a health care facility (Primary)  2. Immunocompromised patient (HCC)  her heme/onc  cont meds.  She will also mask and ensure preventative measures given upcoming [facility] cruise.   3. Malignant lymphoplasmacytic lymphoma (HCC)  stable  active surveillance with [facility]  cont meds.   4. Marginal zone lymphoma (HCC) stable  active surveillance with [facility]  cont meds.   5. 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   6. Waldenstrom macroglobulinemia stable  active surveillance with [facility]  cont meds.   7. Hyperlipidemia, mixed  stable  zetia.  Per [facility].  Considering repatha.   8. Chronic lymphocytic thyroiditis  stable  monitor.  Follows with [facility].  Levothyroxine,.    9. Change in bowel habits  monitor,.  Has been stable overall recently.    10. Gastroesophageal reflux disease without esophagitis  stable  pantoprazole   -     Pantoprazole Sodium; Take 1 tablet (40 mg total) by mouth before breakfast.  Dispense: 90 tablet; Refill: 3  11. History of diverticulitis  stable  monitor.   12. History of basal cell carcinoma (BCC)  stable  per derm.   13. Distal paresthesia  stable  Dr Good.  EEG recommended.    14. Leukopenia, unspecified type  per heme/onc.  Has f/u    15. Statin myopathy  monitor     Assessment & Plan  Tingling sensation  Increasing tingling sensation with unclear etiology. Differential includes neurological causes or treatment effects. Neurologist suspects small seizures.  - Perform EEG to evaluate neurological function.    Hyperlipidemia, mixed  LDL at 112, not optimal. Total cholesterol high due to HDL of 88. On ezetimibe due to adverse effects from previous medications.  - Continue ezetimibe 10 MG oral.    Esophageal reflux  Symptoms effectively managed with pantoprazole.  - Continue pantoprazole 40 MG oral daily.    Follow-up  Regular follow-ups with hematologist every three months. Recently saw endocrinologist. Scheduled for ultrasound and other tests as per specialist's recommendations.  - Follow up with hematologist in August.  - Ensure completion of scheduled ultrasound and other tests.  The patient indicates understanding of these issues and agrees to the plan.  Reinforced healthy diet, lifestyle, and exercise.      No follow-ups on file.     SRIKANTH Schulz, 6/23/2025     Supplementary Documentation:   General Health:  In the past six months, have you lost more than 10 pounds without trying?: (Patient-Rptd) 2 - No  Has your appetite been poor?: (Patient-Rptd) No  Type of Diet: (Patient-Rptd) Balanced  How does the patient maintain a good energy level?: (Patient-Rptd) Appropriate Exercise  How would you describe your daily physical activity?: (Patient-Rptd) Light  How would you describe your current health state?: (Patient-Rptd) Good  How do you maintain positive mental well-being?: (Patient-Rptd) Social Interaction, Games, Visiting Friends, Visiting Family  On a scale of 0 to 10, with 0 being no pain and 10 being severe pain, what is your pain level?: (Patient-Rptd) 0 - (None)  In the past six months, have you experienced urine leakage?: (Patient-Rptd) 0-No  At any time do you feel concerned for the safety/well-being of yourself and/or your  children, in your home or elsewhere?: (Patient-Rptd) No    Health Maintenance   Topic Date Due    Mammogram  09/13/2022    Pneumococcal Vaccine: 50+ Years (4 of 4 - PCV20 or PCV21) 11/07/2022    COVID-19 Vaccine (6 - 2024-25 season) 09/01/2024    Annual Physical  05/14/2025    Pap Smear  01/01/2045 (Originally 10/8/2024)    Influenza Vaccine (Season Ended) 10/01/2025    Colorectal Cancer Screening  12/26/2033    DEXA Scan  Completed    Annual Depression Screening  Completed    Fall Risk Screening (Annual)  Completed    Zoster Vaccines  Completed    Meningococcal B Vaccine  Aged Out        The following individual(s) verbally consented to be recorded using ambient AI listening technology and understand that they can each withdraw their consent to this listening technology at any point by asking the clinician to turn off or pause the recording:    Patient name: Christina Allan  Additional names:             [1]   Patient Active Problem List  Diagnosis    Waldenstrom macroglobulinemia    Non Hodgkin's lymphoma (HCC)    History of basal cell carcinoma (BCC)    Chronic lymphocytic thyroiditis    Esophageal reflux    H/O nasal septoplasty    Hypertrophy of nasal turbinates    Polyarthritis    Rosacea    Seborrheic keratosis    Other acne    Cherry angioma    Hyperlipidemia, mixed    Immunocompromised patient (HCC)    Lentigines    Actinic keratosis    Inflamed seborrheic keratosis    SK (seborrheic keratosis)    Malignant lymphoplasmacytic lymphoma (HCC)    Marginal zone lymphoma (HCC)    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    fluticasone propionate 50 MCG/ACT Nasal Suspension 1 spray by Nasal route 2 (two) times daily.     ezetimibe (ZETIA) 10 MG Oral Tab Zetia 10 mg tablet, [RxNorm: 152138]    Calcium Carb-Cholecalciferol 500-10 MG-MCG Oral Tab Calcium 500 + D 500 mg-10 mcg (400 unit) tablet, [RxNorm: 058625]    RESTASIS 0.05 % Ophthalmic Emulsion Place 1 drop into both eyes 2 (two) times daily.    sulfamethoxazole-trimethoprim -160 MG Oral Tab per tablet Take 1 tablet by mouth 3 (three) times a week.    acyclovir 400 MG Oral Tab Take 1 tablet (400 mg total) by mouth daily.    Omega-3 Fatty Acids (FISH OIL OR) Take by mouth.    Levothyroxine Sodium (SYNTHROID) 50 MCG Oral Tab TAKE 1 TABLET BY MOUTH EVERY DAY    Calcium Carbonate (CALCIUM 500 OR) Take  by mouth.    Cholecalciferol (VITAMIN D) 1000 UNITS Oral Tab Take  by mouth.    Vitamin B-12 (VITAMIN B12) 1000 MCG Oral Tab Take 1 tablet (1,000 mcg total) by mouth daily.

## 2025-06-30 ENCOUNTER — NURSE ONLY (OUTPATIENT)
Dept: ELECTROPHYSIOLOGY | Facility: HOSPITAL | Age: 70
End: 2025-06-30
Attending: Other
Payer: MEDICARE

## 2025-06-30 DIAGNOSIS — R20.2 DISTAL PARESTHESIA: ICD-10-CM

## 2025-06-30 PROCEDURE — 95819 EEG AWAKE AND ASLEEP: CPT

## 2025-07-17 ENCOUNTER — APPOINTMENT (OUTPATIENT)
Dept: DERMATOLOGY | Age: 70
End: 2025-07-17

## 2025-07-17 DIAGNOSIS — L82.0 INFLAMED SEBORRHEIC KERATOSIS: Primary | ICD-10-CM

## 2025-07-17 DIAGNOSIS — L71.9 ROSACEA: ICD-10-CM

## 2025-07-17 DIAGNOSIS — Z12.83 SCREENING EXAM FOR SKIN CANCER: ICD-10-CM

## 2025-07-17 DIAGNOSIS — D18.01 CHERRY ANGIOMA: ICD-10-CM

## 2025-07-17 DIAGNOSIS — Z85.828 HISTORY OF BASAL CELL CARCINOMA (BCC): ICD-10-CM

## 2025-07-17 DIAGNOSIS — L81.4 LENTIGINES: ICD-10-CM

## 2025-08-09 ENCOUNTER — HOSPITAL ENCOUNTER (OUTPATIENT)
Age: 70
Discharge: HOME | End: 2025-08-09

## 2025-08-09 ENCOUNTER — PATIENT MESSAGE (OUTPATIENT)
Dept: INTERNAL MEDICINE CLINIC | Facility: CLINIC | Age: 70
End: 2025-08-09

## 2025-08-09 VITALS
TEMPERATURE: 98 F | RESPIRATION RATE: 16 BRPM | HEART RATE: 69 BPM | OXYGEN SATURATION: 100 % | SYSTOLIC BLOOD PRESSURE: 129 MMHG | DIASTOLIC BLOOD PRESSURE: 83 MMHG

## 2025-08-09 DIAGNOSIS — U07.1 COVID-19: Primary | ICD-10-CM

## 2025-08-09 DIAGNOSIS — R05.9 COUGH: ICD-10-CM

## 2025-08-09 LAB — SARS-COV-2 RNA RESP QL NAA+PROBE: DETECTED

## 2025-08-09 PROCEDURE — 99213 OFFICE O/P EST LOW 20 MIN: CPT | Performed by: NURSE PRACTITIONER

## 2025-08-09 PROCEDURE — U0002 COVID-19 LAB TEST NON-CDC: HCPCS | Performed by: NURSE PRACTITIONER

## 2025-08-18 ENCOUNTER — NURSE TRIAGE (OUTPATIENT)
Dept: INTERNAL MEDICINE CLINIC | Facility: CLINIC | Age: 70
End: 2025-08-18

## 2025-08-18 ENCOUNTER — HOSPITAL ENCOUNTER (OUTPATIENT)
Age: 70
Discharge: HOME | End: 2025-08-18

## 2025-08-18 VITALS
SYSTOLIC BLOOD PRESSURE: 110 MMHG | OXYGEN SATURATION: 100 % | DIASTOLIC BLOOD PRESSURE: 66 MMHG | TEMPERATURE: 99 F | HEART RATE: 60 BPM | RESPIRATION RATE: 16 BRPM

## 2025-08-18 DIAGNOSIS — N30.90 CYSTITIS: ICD-10-CM

## 2025-08-18 DIAGNOSIS — R30.0 DYSURIA: Primary | ICD-10-CM

## 2025-08-18 LAB
BILIRUB UR QL STRIP: NEGATIVE
COLOR UR: YELLOW
GLUCOSE UR STRIP-MCNC: NEGATIVE MG/DL
KETONES UR STRIP-MCNC: NEGATIVE MG/DL
NITRITE UR QL STRIP: NEGATIVE
PH UR STRIP: 6.5
PROT UR STRIP-MCNC: 100 MG/DL
SP GR UR STRIP: 1.01
UROBILINOGEN UR STRIP-ACNC: <2 MG/DL

## 2025-08-18 PROCEDURE — 81002 URINALYSIS NONAUTO W/O SCOPE: CPT | Performed by: NURSE PRACTITIONER

## 2025-08-18 PROCEDURE — 99214 OFFICE O/P EST MOD 30 MIN: CPT | Performed by: NURSE PRACTITIONER

## 2025-08-18 RX ORDER — SULFAMETHOXAZOLE AND TRIMETHOPRIM 800; 160 MG/1; MG/1
1 TABLET ORAL 2 TIMES DAILY
Qty: 6 TABLET | Refills: 0 | Status: SHIPPED | OUTPATIENT
Start: 2025-08-18 | End: 2025-08-18

## 2025-08-18 RX ORDER — CEPHALEXIN 500 MG/1
500 CAPSULE ORAL 2 TIMES DAILY
Qty: 14 CAPSULE | Refills: 0 | Status: SHIPPED | OUTPATIENT
Start: 2025-08-18 | End: 2025-08-20

## 2025-08-20 ENCOUNTER — HOSPITAL ENCOUNTER (OUTPATIENT)
Age: 70
Discharge: HOME | End: 2025-08-20

## 2025-08-20 VITALS
TEMPERATURE: 99 F | SYSTOLIC BLOOD PRESSURE: 110 MMHG | HEART RATE: 70 BPM | OXYGEN SATURATION: 97 % | RESPIRATION RATE: 18 BRPM | DIASTOLIC BLOOD PRESSURE: 67 MMHG

## 2025-08-20 DIAGNOSIS — B07.8 OTHER VIRAL WARTS: Primary | ICD-10-CM

## 2025-08-20 PROCEDURE — 99213 OFFICE O/P EST LOW 20 MIN: CPT | Performed by: NURSE PRACTITIONER

## 2025-08-20 RX ORDER — NITROFURANTOIN 25; 75 MG/1; MG/1
100 CAPSULE ORAL 2 TIMES DAILY
Qty: 14 CAPSULE | Refills: 0 | Status: SHIPPED | OUTPATIENT
Start: 2025-08-20 | End: 2025-08-20

## 2025-08-27 ENCOUNTER — TELEPHONE (OUTPATIENT)
Dept: DERMATOLOGY | Age: 70
End: 2025-08-27

## 2025-08-27 ENCOUNTER — OFFICE VISIT (OUTPATIENT)
Dept: INTERNAL MEDICINE CLINIC | Facility: CLINIC | Age: 70
End: 2025-08-27

## 2025-08-27 VITALS
WEIGHT: 145.38 LBS | BODY MASS INDEX: 24.52 KG/M2 | SYSTOLIC BLOOD PRESSURE: 98 MMHG | DIASTOLIC BLOOD PRESSURE: 60 MMHG | OXYGEN SATURATION: 98 % | TEMPERATURE: 99 F | HEIGHT: 64.5 IN | HEART RATE: 76 BPM | RESPIRATION RATE: 16 BRPM

## 2025-08-27 DIAGNOSIS — U07.1 COVID: ICD-10-CM

## 2025-08-27 DIAGNOSIS — L98.9 SKIN LESION: Primary | ICD-10-CM

## 2025-08-27 DIAGNOSIS — N30.00 ACUTE CYSTITIS WITHOUT HEMATURIA: ICD-10-CM

## 2025-08-27 PROCEDURE — G2211 COMPLEX E/M VISIT ADD ON: HCPCS | Performed by: NURSE PRACTITIONER

## 2025-08-27 PROCEDURE — 99214 OFFICE O/P EST MOD 30 MIN: CPT | Performed by: NURSE PRACTITIONER

## 2025-08-27 RX ORDER — ALIROCUMAB 75 MG/ML
INJECTION, SOLUTION SUBCUTANEOUS
COMMUNITY
Start: 2025-08-15

## 2025-08-28 ENCOUNTER — OFFICE VISIT (OUTPATIENT)
Dept: DERMATOLOGY | Age: 70
End: 2025-08-28

## 2025-08-28 DIAGNOSIS — C44.92 KERATOACANTHOMA TYPE SQUAMOUS CELL CARCINOMA OF SKIN: Primary | ICD-10-CM

## 2025-09-16 ENCOUNTER — APPOINTMENT (OUTPATIENT)
Dept: SURGERY | Age: 70
End: 2025-09-16
Attending: DERMATOLOGY

## 2026-01-08 ENCOUNTER — APPOINTMENT (OUTPATIENT)
Dept: DERMATOLOGY | Age: 71
End: 2026-01-08

## (undated) DEVICE — FILTERLINE NASAL ADULT O2/CO2

## (undated) DEVICE — 1200CC GUARDIAN II: Brand: GUARDIAN

## (undated) DEVICE — ENDOSCOPY PACK UPPER: Brand: MEDLINE INDUSTRIES, INC.

## (undated) DEVICE — BALLOON HEMOSTATIC EUS RADIAL

## (undated) DEVICE — GIJAW SINGLE-USE BIOPSY FORCEPS WITH NEEDLE: Brand: GIJAW

## (undated) DEVICE — 10FT COMBINED O2 DELIVERY/CO2 MONITORING. FILTER WITH MICROSTREAM TYPE LUER: Brand: DUAL ADULT NASAL CANNULA

## (undated) DEVICE — ENDOSCOPIC ULTRASOUND FINE NEEDLE BIOPSY (FNB) DEVICE: Brand: ACQUIRE

## (undated) DEVICE — TRAP 4 CPTR CHMBR N EZ INLN

## (undated) DEVICE — SNARE CAPTIFLEX MICRO-OVL OLY

## (undated) DEVICE — 3M™ RED DOT™ MONITORING ELECTRODE WITH FOAM TAPE AND STICKY GEL, 50/BAG, 20/CASE, 72/PLT 2570: Brand: RED DOT™

## (undated) DEVICE — KIT VLV 5 PC AIR H2O SUCT BX ENDOGATOR CONN

## (undated) DEVICE — ENDOSCOPY PACK - LOWER: Brand: MEDLINE INDUSTRIES, INC.

## (undated) DEVICE — STERIS KITS

## (undated) DEVICE — Device: Brand: DEFENDO AIR/WATER/SUCTION AND BIOPSY VALVE

## (undated) DEVICE — FORCEP RADIAL JAW 4

## (undated) NOTE — LETTER
03/15/21    Patient: Amy Stanton  : 1955 Visit date: 3/15/2021    Dear  Keith Tubbs MD    Thank you for referring Amy Romy to my practice. Please find my assessment and plan below.       Assessment   B-cell lymphoma of lymph no Sincerely,       Titi Martinez MD   CC: MD Halima Gregory, SRIKANTH      Collected:  3/5/2021 Status:  Final result   Visible to patient:  Yes (MyChart) Dx:  Neoplasm of uncertain behavior of skin   0 Result Notes  Component   Ref Range & Units

## (undated) NOTE — ED AVS SNAPSHOT
THE [facility] Emergency Department in [facility] [address]  Phone:  [phone]  Fax:  [phone]          Mrs. Wanda Childers   MRN: [id number]    Department:  THE [facility] Emergency Department in [place]   Date of Visit: IF THERE IS ANY CHANGE OR WORSENING OF YOUR CONDITION, CALL YOUR PRIMARY CARE PHYSICIAN AT ONCE OR RETURN IMMEDIATELY TO THE EMERGENCY DEPARTMENT.     If you have been prescribed any medication(s), please fill your prescription right away and begin taking t

## (undated) NOTE — MR AVS SNAPSHOT
[facility] [address] [address] [phone]               Thank you for choosing us for your health care visit with KULDIP Simpson.   We are glad to serve you and happy to provide you with this summar KETONES (URINE DIPSTICK) Neg Negative mg/dL    SPECIFIC GRAVITY 1.005 1.005 - 1.030    OCCULT BLOOD Neg Negative    PH, URINE 6.5 4.5 - 8.0    PROTEIN (URINE DIPSTICK) Neg Negative/Trace mg/dL    UROBILINOGEN,SEMI-QN 0.2 0.0 - 1.9 mg/dL    NITRITE, URINE

## (undated) NOTE — LETTER
03/24/21        [address] [address]      Dear Lainey Mami,    [facility] [facility] records indicate that you have outstanding lab work and or testing that was ordered for you and has not yet been completed:  Orders Placed This Encounter

## (undated) NOTE — LETTER
21    Patient: Caterina Campo  : 1955 Visit date: 3/1/2021    Dear  Sophia Baig MD    Thank you for referring Caterina Campo to my practice. Please find my assessment and plan below.        Assessment   Neoplasm of uncertain beha Cutaneous manifestations are usually nonspecific such as purpura, ulcers, and urticarial lesions. These lesions are caused by hyperviscosity of the blood, immune complex-mediated vascular damage, paraprotein deposition, and amyloid deposition.  Specific ski We will be proceeding to the operating room under general anesthesia to do a wide local excision of the solitary lesion just above the elbow crease between the upper arm and lower arm.   This is clearly in the upper arm over the lateral aspect of the bicep

## (undated) NOTE — LETTER
10/04/19        Ed Bookbinder  [address] [place] [id number]      Dear Andrew Wilks,    Our records indicate that you have outstanding lab work and or testing that was ordered for you and has not yet been completed:  Orders Placed This Encounter